# Patient Record
Sex: FEMALE | Race: WHITE | NOT HISPANIC OR LATINO | Employment: FULL TIME | URBAN - METROPOLITAN AREA
[De-identification: names, ages, dates, MRNs, and addresses within clinical notes are randomized per-mention and may not be internally consistent; named-entity substitution may affect disease eponyms.]

---

## 2017-02-23 ENCOUNTER — OFFICE VISIT (OUTPATIENT)
Dept: FAMILY MEDICINE CLINIC | Facility: CLINIC | Age: 32
End: 2017-02-23

## 2017-02-23 VITALS
DIASTOLIC BLOOD PRESSURE: 119 MMHG | BODY MASS INDEX: 34.15 KG/M2 | WEIGHT: 200 LBS | TEMPERATURE: 98.2 F | SYSTOLIC BLOOD PRESSURE: 157 MMHG | HEART RATE: 80 BPM | RESPIRATION RATE: 16 BRPM | HEIGHT: 64 IN

## 2017-02-23 DIAGNOSIS — I10 ESSENTIAL HYPERTENSION: ICD-10-CM

## 2017-02-23 PROCEDURE — 99213 OFFICE O/P EST LOW 20 MIN: CPT | Performed by: FAMILY MEDICINE

## 2017-02-23 RX ORDER — RANITIDINE 150 MG/1
150 TABLET ORAL 2 TIMES DAILY
COMMUNITY
End: 2019-08-12

## 2017-02-23 RX ORDER — LABETALOL 200 MG/1
200 TABLET, FILM COATED ORAL 2 TIMES DAILY
Qty: 180 TABLET | Refills: 1 | Status: SHIPPED | OUTPATIENT
Start: 2017-02-23 | End: 2018-02-01 | Stop reason: SDUPTHER

## 2017-02-23 RX ORDER — NIFEDIPINE 60 MG/1
60 TABLET, EXTENDED RELEASE ORAL DAILY
Qty: 90 TABLET | Refills: 1 | Status: SHIPPED | OUTPATIENT
Start: 2017-02-23 | End: 2018-01-15 | Stop reason: ALTCHOICE

## 2017-02-23 NOTE — PROGRESS NOTES
"Chief Complaint   Patient presents with   • Headache   • Hypertension       Subjective   This patient presents the office to recheck arterial hypertension of 8 months duration.  She remains on labetalol twice daily and 30 mg of nifedipine.  She may be thinking about getting pregnant in the near future.  We will increased nifedipine dose was short-term follow-up.    PHQ-2 Depression Screening Questionaire    During the last month, have you often been bothered by feeling down, depressed, or hopeless?no      During the last month, have you often been bothered by having little interest or pleasure in doing things?no    Review of Systems   Constitutional: Negative for fatigue.   Cardiovascular: Negative for chest pain.       Objective   Visit Vitals   • BP (!) 157/119   • Pulse 80   • Temp 98.2 °F (36.8 °C) (Oral)   • Resp 16   • Ht 64\" (162.6 cm)   • Wt 200 lb (90.7 kg)   • BMI 34.33 kg/m2     Body mass index is 34.33 kg/(m^2).  Physical Exam   Constitutional: She is cooperative. No distress.   Eyes: Conjunctivae and lids are normal.   Neck: Carotid bruit is not present. No tracheal deviation present.   Cardiovascular: Normal rate, regular rhythm and normal heart sounds.    No murmur heard.  Pulmonary/Chest: Effort normal and breath sounds normal.   Neurological: She is alert. She is not disoriented.   Skin: Skin is warm and dry.   Psychiatric: She has a normal mood and affect. Her speech is normal and behavior is normal.   Vitals reviewed.      Assessment/Plan     Problem List Items Addressed This Visit        Cardiovascular and Mediastinum    Essential hypertension    Relevant Medications    labetalol (NORMODYNE) 200 MG tablet    NIFEdipine XL (PROCARDIA XL) 60 MG 24 hr tablet          Outpatient Encounter Prescriptions as of 2/23/2017   Medication Sig Dispense Refill   • labetalol (NORMODYNE) 200 MG tablet Take 1 tablet by mouth 2 (Two) Times a Day for 180 days. 180 tablet 1   • Multiple Vitamins-Minerals (MULTI FOR " HER PO) Take  by mouth.     • raNITIdine (ZANTAC) 150 MG tablet Take 150 mg by mouth 2 (Two) Times a Day.     • [DISCONTINUED] labetalol (NORMODYNE) 200 MG tablet Take 1 tablet by mouth 2 (two) times a day for 180 days. 180 tablet 1   • [DISCONTINUED] norethindrone (MICRONOR) 0.35 MG tablet TAKE 1 TABLET BY MOUTH DAILY  12   • NIFEdipine XL (PROCARDIA XL) 60 MG 24 hr tablet Take 1 tablet by mouth Daily for 180 days. 90 tablet 1   • [DISCONTINUED] citalopram (CeleXA) 20 MG tablet TAKE 1 TABLET BY MOUTH DAILY  5   • [DISCONTINUED] NIFEdipine XL (PROCARDIA XL) 30 MG 24 hr tablet Take 1 tablet by mouth daily for 180 days. 90 tablet 1     No facility-administered encounter medications on file as of 2/23/2017.        No orders of the defined types were placed in this encounter.      Continue with current treatment plan.         F/U in 6 weeks

## 2017-03-08 DIAGNOSIS — I10 ESSENTIAL HYPERTENSION: ICD-10-CM

## 2017-03-08 RX ORDER — NIFEDIPINE 30 MG/1
TABLET, EXTENDED RELEASE ORAL
Qty: 90 TABLET | Refills: 1 | OUTPATIENT
Start: 2017-03-08

## 2017-08-10 DIAGNOSIS — I10 ESSENTIAL HYPERTENSION: ICD-10-CM

## 2017-08-10 RX ORDER — LABETALOL 200 MG/1
TABLET, FILM COATED ORAL
Qty: 180 TABLET | Refills: 1 | OUTPATIENT
Start: 2017-08-10

## 2017-08-10 RX ORDER — NIFEDIPINE 60 MG/1
TABLET, EXTENDED RELEASE ORAL
Qty: 90 TABLET | Refills: 1 | OUTPATIENT
Start: 2017-08-10

## 2018-01-15 ENCOUNTER — OFFICE VISIT (OUTPATIENT)
Dept: FAMILY MEDICINE CLINIC | Facility: CLINIC | Age: 33
End: 2018-01-15

## 2018-01-15 VITALS
HEIGHT: 64 IN | HEART RATE: 71 BPM | SYSTOLIC BLOOD PRESSURE: 151 MMHG | TEMPERATURE: 97.6 F | RESPIRATION RATE: 16 BRPM | WEIGHT: 203 LBS | DIASTOLIC BLOOD PRESSURE: 103 MMHG | BODY MASS INDEX: 34.66 KG/M2

## 2018-01-15 DIAGNOSIS — I10 ESSENTIAL HYPERTENSION: Primary | ICD-10-CM

## 2018-01-15 PROCEDURE — 99213 OFFICE O/P EST LOW 20 MIN: CPT | Performed by: FAMILY MEDICINE

## 2018-01-15 RX ORDER — AMLODIPINE, VALSARTAN AND HYDROCHLOROTHIAZIDE 5; 160; 12.5 MG/1; MG/1; MG/1
1 TABLET ORAL DAILY
Qty: 30 TABLET | Refills: 0 | Status: SHIPPED | OUTPATIENT
Start: 2018-01-15 | End: 2018-02-01 | Stop reason: SDUPTHER

## 2018-01-15 NOTE — PROGRESS NOTES
"Chief Complaint   Patient presents with   • Hypertension       Subjective   This patient presents the office to recheck arterial hypertension.  It is not currently controlled.  She had delivery in November 2017 and is finished with breast-feeding.  We will change from nifedipine to Exforge HCT and for now, continue Normodyne twice daily.  Short-term follow-up in 3 weeks.  I have reviewed and updated her medications, medical history and problem list during today's office visit.        Social History   Substance Use Topics   • Smoking status: Former Smoker     Packs/day: 0.25     Types: Cigarettes   • Smokeless tobacco: Not on file      Comment: quit Sept 2016   • Alcohol use Yes      Comment: rare       Review of Systems   Constitutional: Negative for fatigue.   Cardiovascular: Negative for chest pain.   Neurological: Negative for headaches.       Objective   BP (!) 151/103  Pulse 71  Temp 97.6 °F (36.4 °C) (Oral)   Resp 16  Ht 162.6 cm (64\")  Wt 92.1 kg (203 lb)  BMI 34.84 kg/m2  Body mass index is 34.84 kg/(m^2).  Physical Exam   Constitutional: She is cooperative. No distress.   Eyes: Conjunctivae and lids are normal.   Neck: Carotid bruit is not present. No tracheal deviation present.   Cardiovascular: Normal rate, regular rhythm and normal heart sounds.    No murmur heard.  Pulmonary/Chest: Effort normal and breath sounds normal.   Neurological: She is alert. She is not disoriented.   Skin: Skin is warm and dry.   Psychiatric: She has a normal mood and affect. Her speech is normal and behavior is normal.   Vitals reviewed.      Data Reviewed:        Assessment/Plan     Problem List Items Addressed This Visit        Cardiovascular and Mediastinum    Essential hypertension - Primary    Relevant Medications    Amlodipine-Valsartan-HCTZ 5-160-12.5 MG tablet          Outpatient Encounter Prescriptions as of 1/15/2018   Medication Sig Dispense Refill   • Amlodipine-Valsartan-HCTZ 5-160-12.5 MG tablet Take 1 " tablet by mouth Daily for 30 days. 30 tablet 0   • labetalol (NORMODYNE) 200 MG tablet Take 1 tablet by mouth 2 (Two) Times a Day for 180 days. 180 tablet 1   • Multiple Vitamins-Minerals (MULTI FOR HER PO) Take  by mouth.     • raNITIdine (ZANTAC) 150 MG tablet Take 150 mg by mouth 2 (Two) Times a Day.     • [DISCONTINUED] NIFEdipine XL (PROCARDIA XL) 60 MG 24 hr tablet Take 1 tablet by mouth Daily for 180 days. 90 tablet 1     No facility-administered encounter medications on file as of 1/15/2018.        No orders of the defined types were placed in this encounter.             F/U in three weeks

## 2018-01-15 NOTE — PATIENT INSTRUCTIONS
Amlodipine; Hydrochlorothiazide, HCTZ; Valsartan tablets  What is this medicine?  AMLODIPINE; HYDROCHLOROTHIAZIDE, HCTZ; VALSARTAN (am HIRO lopez peen; tam droe klor oh THYE a zide; emily GREY tan) is a combination of a calcium channel blocker, a diuretic, and an angiotensin II antagonist. This medicine is used to treat high blood pressure.  This medicine may be used for other purposes; ask your health care provider or pharmacist if you have questions.  COMMON BRAND NAME(S): Exforge HCT  What should I tell my health care provider before I take this medicine?  They need to know if you have any of these conditions:  -decreased urine  -heart failure, recent heart attack, or other heart problems  -if you are on a special diet, like a low salt diet  -immune system problems, like lupus  -kidney disease  -liver disease  -an unusual or allergic reaction to amlodipine, hydrochlorothiazide, sulfa drugs, valsartan, other medicines, foods, dyes, or preservatives  -pregnant or trying to get pregnant  -breast-feeding  How should I use this medicine?  Take this medicine by mouth with a glass of water. Follow the directions on the prescription label. You can take it with or without food. If it upsets your stomach, take it with food. Take your medicine at regular intervals. Do not take it more often than directed. Do not stop taking except on your doctor's advice.  Talk to your pediatrician regarding the use of this medicine in children. Special care may be needed.  Patients over 65 years old may have a stronger reaction and need a smaller dose.  Overdosage: If you think you have taken too much of this medicine contact a poison control center or emergency room at once.  NOTE: This medicine is only for you. Do not share this medicine with others.  What if I miss a dose?  If you miss a dose, take it as soon as you can. If it is almost time for your next dose, take only that dose. Do not take double or extra doses.  What may interact with  this medicine?  -alcohol  -barbiturates like phenobarbital  -carbamazepine  -diuretics like triamterene, spironolactone, or amiloride  -lithium  -medicines for blood pressure  -medicines for diabetes  -NSAIDS, medicines for pain and inflammation, like ibuprofen or naproxen  -potassium salts or potassium supplements  -prescription pain medicines  -skeletal muscle relaxants like tubocurarine  -some cholesterol lowering medicines like cholestyramine or colestipol  -steroid medicines like prednisone or cortisone  This list may not describe all possible interactions. Give your health care provider a list of all the medicines, herbs, non-prescription drugs, or dietary supplements you use. Also tell them if you smoke, drink alcohol, or use illegal drugs. Some items may interact with your medicine.  What should I watch for while using this medicine?  Visit your doctor or health care professional for regular checks on your progress. Check your blood pressure as directed. Ask your doctor or health care professional what your blood pressure should be and when you should contact him or her.  You must not get dehydrated. Ask your doctor or health care professional how much fluid you need to drink a day. Check with him or her if you get an attack of severe diarrhea, nausea and vomiting, or if you sweat a lot. The loss of too much body fluid can make it dangerous for you to take this medicine.  Women should inform their doctor if they wish to become pregnant or think they might be pregnant. There is a potential for serious side effects to an unborn child. Talk to your health care professional or pharmacist for more information.  You may get drowsy or dizzy. Do not drive, use machinery, or do anything that needs mental alertness until you know how this medicine affects you. Do not stand or sit up quickly, especially if you are an older patient. This reduces the risk of dizzy or fainting spells. Alcohol may interfere with the effect  of this medicine. Avoid alcoholic drinks.  This medicine may affect your blood sugar level. If you have diabetes, check with your doctor or health care professional before changing the dose of your diabetic medicine.  Avoid salt substitutes unless you are told otherwise by your doctor or health care professional.  This medicine can make you more sensitive to the sun. Keep out of the sun. If you cannot avoid being in the sun, wear protective clothing and use sunscreen. Do not use sun lamps or tanning beds/booths.  Do not treat yourself for coughs, colds, or pain while you are taking this medicine without asking your doctor or health care professional for advice. Some ingredients may increase your blood pressure.  If you are going to have surgery or dialysis, tell your doctor or health care professional that you are taking this medicine.  What side effects may I notice from receiving this medicine?  Side effects that you should report to your doctor or health care professional as soon as possible:  -allergic reactions like skin rash, itching or hives, swelling of the face, lips, or tongue  -breathing problems  -changes in vision  -chest pain  -confusion  -dark urine  -eye pain  -fast, irregular heartbeat  -feeling faint or lightheaded, falls  -low blood pressure  -muscle cramps  -redness, blistering, peeling or loosening of the skin, including inside the mouth  -stomach pain  -swelling of the hands, ankles, or feet  -trouble passing urine or change in the amount of urine  -worsened gout pain  -yellowing of the eyes or skin  Side effects that usually do not require medical attention (report to your doctor or health care professional if they continue or are bothersome):  -change in sex drive or performance  -cough  -diarrhea  -flushing of face, skin  -headache  -nausea, vomiting  -stomach gas, pain  -weak or tired  This list may not describe all possible side effects. Call your doctor for medical advice about side  effects. You may report side effects to FDA at 5-241-WJD-1467.  Where should I keep my medicine?  Keep out of the reach of children.  Store at room temperature between 15 and 30 degrees C (59 and 86 degrees F). Protect from moisture. Throw away any unused medicine after the expiration date.  NOTE: This sheet is a summary. It may not cover all possible information. If you have questions about this medicine, talk to your doctor, pharmacist, or health care provider.  © 2018 [employer] (2017-01-18 18:45:28)

## 2018-02-01 ENCOUNTER — OFFICE VISIT (OUTPATIENT)
Dept: FAMILY MEDICINE CLINIC | Facility: CLINIC | Age: 33
End: 2018-02-01

## 2018-02-01 VITALS
BODY MASS INDEX: 34.66 KG/M2 | TEMPERATURE: 97.1 F | DIASTOLIC BLOOD PRESSURE: 90 MMHG | RESPIRATION RATE: 16 BRPM | WEIGHT: 203 LBS | HEART RATE: 71 BPM | HEIGHT: 64 IN | SYSTOLIC BLOOD PRESSURE: 118 MMHG

## 2018-02-01 DIAGNOSIS — I10 ESSENTIAL HYPERTENSION: ICD-10-CM

## 2018-02-01 DIAGNOSIS — M54.50 ACUTE MIDLINE LOW BACK PAIN WITHOUT SCIATICA: Primary | ICD-10-CM

## 2018-02-01 PROCEDURE — 99213 OFFICE O/P EST LOW 20 MIN: CPT | Performed by: FAMILY MEDICINE

## 2018-02-01 RX ORDER — AMLODIPINE, VALSARTAN AND HYDROCHLOROTHIAZIDE 5; 160; 12.5 MG/1; MG/1; MG/1
1 TABLET ORAL DAILY
Qty: 90 TABLET | Refills: 1 | Status: SHIPPED | OUTPATIENT
Start: 2018-02-01 | End: 2019-08-12

## 2018-02-01 RX ORDER — LABETALOL 200 MG/1
200 TABLET, FILM COATED ORAL EVERY 12 HOURS SCHEDULED
Qty: 180 TABLET | Refills: 1 | Status: SHIPPED | OUTPATIENT
Start: 2018-02-01 | End: 2019-08-12

## 2018-02-01 RX ORDER — CYCLOBENZAPRINE HCL 10 MG
10 TABLET ORAL NIGHTLY PRN
Qty: 15 TABLET | Refills: 0 | Status: SHIPPED | OUTPATIENT
Start: 2018-02-01 | End: 2020-08-21

## 2018-02-01 NOTE — PROGRESS NOTES
"Chief Complaint   Patient presents with   • Hypertension       Subjective    This patient presents the office to recheck arterial hypertension.  It is much improved.  Initially she had side effects of nausea but currently none with her current medicine.  She has had some back pain recently.  In the past she was treated with Flexeril successfully.  We talked about the as needed use of this only and short-term use.  She is aware that medicine could cause drowsiness and problems with reaction time.  She also understands that she should not mix it with alcoholic beverages.  I have reviewed and updated her medications, medical history and problem list during today's office visit.        Social History   Substance Use Topics   • Smoking status: Former Smoker     Packs/day: 0.25     Types: Cigarettes   • Smokeless tobacco: Never Used      Comment: quit Sept 2016   • Alcohol use Yes      Comment: rare       Review of Systems   Musculoskeletal: Positive for back pain.       Objective   /90 (BP Location: Right arm, Patient Position: Sitting, Cuff Size: Large Adult)  Pulse 71  Temp 97.1 °F (36.2 °C) (Oral)   Resp 16  Ht 162.6 cm (64\")  Wt 92.1 kg (203 lb)  BMI 34.84 kg/m2  Body mass index is 34.84 kg/(m^2).  Physical Exam   Constitutional: She is cooperative. No distress.   Eyes: Conjunctivae and lids are normal.   Neck: Carotid bruit is not present. No tracheal deviation present.   Cardiovascular: Normal rate, regular rhythm and normal heart sounds.    No murmur heard.  Pulmonary/Chest: Effort normal and breath sounds normal.   Neurological: She is alert. She is not disoriented.   Skin: Skin is warm and dry.   Psychiatric: She has a normal mood and affect. Her speech is normal and behavior is normal.   Vitals reviewed.      Data Reviewed:        Assessment/Plan     Problem List Items Addressed This Visit        Cardiovascular and Mediastinum    Essential hypertension    Relevant Medications    " Amlodipine-Valsartan-HCTZ 5-160-12.5 MG tablet    labetalol (NORMODYNE) 200 MG tablet      Other Visit Diagnoses     Acute midline low back pain without sciatica    -  Primary          Outpatient Encounter Prescriptions as of 2/1/2018   Medication Sig Dispense Refill   • Amlodipine-Valsartan-HCTZ 5-160-12.5 MG tablet Take 1 tablet by mouth Daily for 180 days. 90 tablet 1   • Multiple Vitamins-Minerals (MULTI FOR HER PO) Take  by mouth.     • raNITIdine (ZANTAC) 150 MG tablet Take 150 mg by mouth 2 (Two) Times a Day.     • [DISCONTINUED] Amlodipine-Valsartan-HCTZ 5-160-12.5 MG tablet Take 1 tablet by mouth Daily for 30 days. 30 tablet 0   • cyclobenzaprine (FLEXERIL) 10 MG tablet Take 1 tablet by mouth At Night As Needed for Muscle Spasms for up to 15 days. 15 tablet 0   • labetalol (NORMODYNE) 200 MG tablet Take 1 tablet by mouth Every 12 (Twelve) Hours for 180 days. 180 tablet 1   • [DISCONTINUED] labetalol (NORMODYNE) 200 MG tablet Take 1 tablet by mouth 2 (Two) Times a Day for 180 days. 180 tablet 1     No facility-administered encounter medications on file as of 2/1/2018.        No orders of the defined types were placed in this encounter.             F/U in 6 months

## 2018-02-07 DIAGNOSIS — I10 ESSENTIAL HYPERTENSION: ICD-10-CM

## 2018-02-08 RX ORDER — AMLODIPINE, VALSARTAN AND HYDROCHLOROTHIAZIDE 5; 160; 12.5 MG/1; MG/1; MG/1
TABLET ORAL
Qty: 30 TABLET | Refills: 0 | OUTPATIENT
Start: 2018-02-08

## 2019-08-12 ENCOUNTER — OFFICE VISIT (OUTPATIENT)
Dept: FAMILY MEDICINE CLINIC | Facility: CLINIC | Age: 34
End: 2019-08-12

## 2019-08-12 VITALS
TEMPERATURE: 97.5 F | SYSTOLIC BLOOD PRESSURE: 122 MMHG | BODY MASS INDEX: 30.49 KG/M2 | HEIGHT: 65 IN | DIASTOLIC BLOOD PRESSURE: 80 MMHG | OXYGEN SATURATION: 98 % | WEIGHT: 183 LBS | HEART RATE: 83 BPM

## 2019-08-12 DIAGNOSIS — K21.9 GASTROESOPHAGEAL REFLUX DISEASE WITHOUT ESOPHAGITIS: ICD-10-CM

## 2019-08-12 DIAGNOSIS — I10 ESSENTIAL HYPERTENSION: Primary | ICD-10-CM

## 2019-08-12 DIAGNOSIS — J30.89 ENVIRONMENTAL AND SEASONAL ALLERGIES: ICD-10-CM

## 2019-08-12 DIAGNOSIS — F41.9 ANXIETY: ICD-10-CM

## 2019-08-12 PROCEDURE — 99214 OFFICE O/P EST MOD 30 MIN: CPT | Performed by: FAMILY MEDICINE

## 2019-08-12 RX ORDER — LABETALOL 100 MG/1
100 TABLET, FILM COATED ORAL 2 TIMES DAILY
Qty: 180 TABLET | Refills: 3 | Status: SHIPPED | OUTPATIENT
Start: 2019-08-12 | End: 2020-08-12

## 2019-08-12 RX ORDER — AMLODIPINE, VALSARTAN AND HYDROCHLOROTHIAZIDE 5; 160; 12.5 MG/1; MG/1; MG/1
1 TABLET ORAL DAILY
Qty: 90 TABLET | Refills: 3 | Status: SHIPPED | OUTPATIENT
Start: 2019-08-12 | End: 2020-07-27

## 2019-08-12 RX ORDER — ALBUTEROL SULFATE 90 UG/1
2 AEROSOL, METERED RESPIRATORY (INHALATION)
COMMUNITY

## 2019-08-12 RX ORDER — LABETALOL 100 MG/1
100 TABLET, FILM COATED ORAL 2 TIMES DAILY
COMMUNITY
End: 2019-08-12

## 2019-08-12 RX ORDER — OMEPRAZOLE 40 MG/1
40 CAPSULE, DELAYED RELEASE ORAL DAILY
Qty: 90 CAPSULE | Refills: 3 | Status: SHIPPED | OUTPATIENT
Start: 2019-08-12

## 2019-08-12 RX ORDER — OMEPRAZOLE 20 MG/1
20 CAPSULE, DELAYED RELEASE ORAL DAILY
COMMUNITY
End: 2019-08-12 | Stop reason: SDUPTHER

## 2019-08-12 NOTE — PROGRESS NOTES
"Subjective   Hope Mya is a 33 y.o. female.     Chief Complaint   Patient presents with   • Hypertension     follow up        History of Present Illness   htn- doing well on meds, has worked on weight loss.     Anxiety- doing well, not on meds    Allergies- stable, uses otc meds prn    gerd- uncontrolled on meds and having worsening symptoms.     The following portions of the patient's history were reviewed and updated as appropriate: allergies, current medications, past family history, past medical history, past social history, past surgical history and problem list.    Past Medical History:   Diagnosis Date   • Headache    • Hypertension    • Kidney stone        Past Surgical History:   Procedure Laterality Date   •  SECTION  2016   •  SECTION  2017       Family History   Problem Relation Age of Onset   • Hypertension Mother    • Hypertension Father    • Heart disease Father    • Diabetes Father    • Alcohol abuse Maternal Grandmother    • Cancer Maternal Grandfather    • Atrial fibrillation Paternal Grandmother    • Heart disease Paternal Grandfather        Social History     Socioeconomic History   • Marital status:      Spouse name: Not on file   • Number of children: Not on file   • Years of education: Not on file   • Highest education level: Not on file   Tobacco Use   • Smoking status: Former Smoker     Packs/day: 0.25     Types: Cigarettes   • Smokeless tobacco: Never Used   • Tobacco comment: quit 2016   Substance and Sexual Activity   • Alcohol use: Yes     Comment: rare       Review of Systems   Respiratory: Negative for shortness of breath.    Cardiovascular: Negative for chest pain.       Objective   Visit Vitals  /80   Pulse 83   Temp 97.5 °F (36.4 °C) (Temporal)   Ht 165.1 cm (65\")   Wt 83 kg (183 lb)   SpO2 98%   BMI 30.45 kg/m²     Body mass index is 30.45 kg/m².  Physical Exam   Constitutional: She is oriented to person, place, and time. She appears " well-developed and well-nourished.   Cardiovascular: Normal rate, regular rhythm, normal heart sounds and intact distal pulses.   Pulmonary/Chest: Effort normal and breath sounds normal.   Musculoskeletal: Normal range of motion. She exhibits no edema.   Neurological: She is alert and oriented to person, place, and time.   Skin: Skin is warm and dry.   Psychiatric: She has a normal mood and affect. Her behavior is normal.         Assessment/Plan   Hope was seen today for hypertension.    Diagnoses and all orders for this visit:    Essential hypertension  -     Lipid Panel  -     Comprehensive Metabolic Panel  -     amLODIPine-Valsartan-HCTZ 5-160-12.5 MG tablet; Take 1 each by mouth Daily.  -     labetalol (NORMODYNE) 100 MG tablet; Take 1 tablet by mouth 2 (Two) Times a Day.    Anxiety    Environmental and seasonal allergies    Gastroesophageal reflux disease without esophagitis  -     omeprazole (priLOSEC) 40 MG capsule; Take 1 capsule by mouth Daily.          Doing well, cont meds, f/u in 6-12 months. Increase PPI and f/u if worse or no better and would consider GI referral for EGD.

## 2019-08-13 LAB
ALBUMIN SERPL-MCNC: 4.6 G/DL (ref 3.5–5.2)
ALBUMIN/GLOB SERPL: 2.3 G/DL
ALP SERPL-CCNC: 61 U/L (ref 39–117)
ALT SERPL-CCNC: 16 U/L (ref 1–33)
AST SERPL-CCNC: 16 U/L (ref 1–32)
BILIRUB SERPL-MCNC: 0.3 MG/DL (ref 0.2–1.2)
BUN SERPL-MCNC: 14 MG/DL (ref 6–20)
BUN/CREAT SERPL: 14.1 (ref 7–25)
CALCIUM SERPL-MCNC: 9.1 MG/DL (ref 8.6–10.5)
CHLORIDE SERPL-SCNC: 101 MMOL/L (ref 98–107)
CHOLEST SERPL-MCNC: 172 MG/DL (ref 0–200)
CO2 SERPL-SCNC: 28.2 MMOL/L (ref 22–29)
CREAT SERPL-MCNC: 0.99 MG/DL (ref 0.57–1)
GLOBULIN SER CALC-MCNC: 2 GM/DL
GLUCOSE SERPL-MCNC: 91 MG/DL (ref 65–99)
HDLC SERPL-MCNC: 51 MG/DL (ref 40–60)
LDLC SERPL CALC-MCNC: 102 MG/DL (ref 0–100)
POTASSIUM SERPL-SCNC: 4.1 MMOL/L (ref 3.5–5.2)
PROT SERPL-MCNC: 6.6 G/DL (ref 6–8.5)
SODIUM SERPL-SCNC: 141 MMOL/L (ref 136–145)
TRIGL SERPL-MCNC: 97 MG/DL (ref 0–150)
VLDLC SERPL CALC-MCNC: 19.4 MG/DL

## 2020-03-12 ENCOUNTER — OFFICE VISIT (OUTPATIENT)
Dept: FAMILY MEDICINE CLINIC | Facility: CLINIC | Age: 35
End: 2020-03-12

## 2020-03-12 VITALS
WEIGHT: 178 LBS | HEIGHT: 65 IN | SYSTOLIC BLOOD PRESSURE: 120 MMHG | OXYGEN SATURATION: 98 % | BODY MASS INDEX: 29.66 KG/M2 | HEART RATE: 88 BPM | TEMPERATURE: 97.6 F | DIASTOLIC BLOOD PRESSURE: 70 MMHG

## 2020-03-12 DIAGNOSIS — M54.2 NECK PAIN ON LEFT SIDE: Primary | ICD-10-CM

## 2020-03-12 PROCEDURE — 99213 OFFICE O/P EST LOW 20 MIN: CPT | Performed by: FAMILY MEDICINE

## 2020-03-12 NOTE — PROGRESS NOTES
Chief Complaint   Patient presents with   • Neck Pain     C/o neck swelling, no major pain and doesn't feel bad.        Subjective   Hope Mya is a 34 y.o. female.     History of Present Illness   Left side of neck is swollen for past 2 days.  She states she feels fine but her boss made her go to [facility] to get checked out.  No fever or chills.  No cough or congestion and she states she does not feel bad.   The following portions of the patient's history were reviewed and updated as appropriate: allergies, current medications, past family history, past medical history, past social history, past surgical history and problem list.    Review of Systems   Constitutional: Negative for fatigue.   Eyes: Negative for blurred vision.   Respiratory: Negative for cough, chest tightness and shortness of breath.    Cardiovascular: Negative for chest pain, palpitations and leg swelling.   Neurological: Negative for dizziness, light-headedness and headache.       Patient Active Problem List   Diagnosis   • Essential hypertension   • Grief reaction   • Anxiety   • Environmental and seasonal allergies   • Gastroesophageal reflux disease without esophagitis   • Neck pain on left side       Allergies   Allergen Reactions   • Cat Hair Extract          Current Outpatient Medications:   •  albuterol sulfate  (90 Base) MCG/ACT inhaler, Inhale 2 puffs., Disp: , Rfl:   •  amLODIPine-Valsartan-HCTZ 5-160-12.5 MG tablet, Take 1 each by mouth Daily., Disp: 90 tablet, Rfl: 3  •  labetalol (NORMODYNE) 100 MG tablet, Take 1 tablet by mouth 2 (Two) Times a Day., Disp: 180 tablet, Rfl: 3  •  Multiple Vitamins-Minerals (MULTI FOR HER PO), Take  by mouth., Disp: , Rfl:   •  omeprazole (priLOSEC) 40 MG capsule, Take 1 capsule by mouth Daily., Disp: 90 capsule, Rfl: 3  •  cyclobenzaprine (FLEXERIL) 10 MG tablet, Take 1 tablet by mouth At Night As Needed for Muscle Spasms for up to 15 days., Disp: 15 tablet, Rfl: 0    Past Medical History:  "  Diagnosis Date   • Allergic rhinitis    • Anxiety    • Asthma    • Headache    • History of gastroesophageal reflux (GERD)    • Hypertension    • Kidney stone    • Low back ache    • Migraine        Past Surgical History:   Procedure Laterality Date   •  SECTION  2016   •  SECTION  2017       Family History   Problem Relation Age of Onset   • Hypertension Mother    • Hyperlipidemia Mother    • Alcohol abuse Mother    • Hypertension Father    • Heart disease Father    • Diabetes Father    • Stroke Father    • COPD Father    • Coronary artery disease Father    • Alcohol abuse Maternal Grandmother    • Depression Maternal Grandmother    • Cancer Maternal Grandfather    • Atrial fibrillation Paternal Grandmother    • Arthritis Paternal Grandmother    • Heart disease Paternal Grandfather        Social History     Tobacco Use   • Smoking status: Former Smoker     Packs/day: 0.25     Types: Cigarettes   • Smokeless tobacco: Never Used   • Tobacco comment: quit 2016   Substance Use Topics   • Alcohol use: Yes     Comment: rare            Objective     Visit Vitals  /70   Pulse 88   Temp 97.6 °F (36.4 °C)   Ht 165.1 cm (65\")   Wt 80.7 kg (178 lb)   SpO2 98%   BMI 29.62 kg/m²       Physical Exam   Constitutional: She appears well-developed and well-nourished. No distress.   HENT:   Head: Normocephalic and atraumatic.   Right Ear: External ear normal.   Left Ear: External ear normal.   Nose: Nose normal.   Mouth/Throat: Oropharynx is clear and moist. No oropharyngeal exudate.   Eyes: Conjunctivae and lids are normal. Right eye exhibits no discharge. Left eye exhibits no discharge. No scleral icterus.   Neck: Trachea normal. Carotid bruit is not present. No tracheal deviation present. No thyroid mass and no thyromegaly present.   Cardiovascular: Normal rate, regular rhythm and normal heart sounds. Exam reveals no friction rub.   No murmur heard.  Pulmonary/Chest: Effort normal and breath " sounds normal. No stridor. No respiratory distress. She has no wheezes. She has no rales.   Lymphadenopathy:     She has no cervical adenopathy.   Neurological: She is alert. She is not disoriented.   Skin: Skin is warm and dry.   Psychiatric: She has a normal mood and affect. Her speech is normal and behavior is normal. She is attentive.   Nursing note and vitals reviewed.      Lab Results   Component Value Date    BUN 14 08/12/2019    CREATININE 0.99 08/12/2019    EGFRIFNONA 64 08/12/2019    EGFRIFAFRI 78 08/12/2019    BCR 14.1 08/12/2019    K 4.1 08/12/2019    CO2 28.2 08/12/2019    CALCIUM 9.1 08/12/2019    PROTENTOTREF 6.6 08/12/2019    ALBUMIN 4.60 08/12/2019    LABIL2 2.3 08/12/2019    AST 16 08/12/2019    ALT 16 08/12/2019       No results found for: WBC, RBC, HGB, HCT, MCV, MCH, MCHC, RDW, RDWSD, MPV, PLT, NEUTRORELPCT, LYMPHORELPCT, MONORELPCT, EOSRELPCT, BASORELPCT, AUTOIGPER, NEUTROABS, LYMPHSABS, MONOSABS, EOSABS, BASOSABS, AUTOIGNUM, NRBC    No results found for: HGBA1C    No results found for: [id number]    No results found for: TSH    No results found for: CHOL  Lab Results   Component Value Date    TRIG 97 08/12/2019     Lab Results   Component Value Date    HDL 51 08/12/2019     Lab Results   Component Value Date     (H) 08/12/2019     Lab Results   Component Value Date    VLDL 19.4 08/12/2019     No results found for: LDLHDL      Procedures    Assessment/Plan   Problems Addressed this Visit        Nervous and Auditory    Neck pain on left side - Primary          No orders of the defined types were placed in this encounter.      Current Outpatient Medications   Medication Sig Dispense Refill   • albuterol sulfate  (90 Base) MCG/ACT inhaler Inhale 2 puffs.     • amLODIPine-Valsartan-HCTZ 5-160-12.5 MG tablet Take 1 each by mouth Daily. 90 tablet 3   • labetalol (NORMODYNE) 100 MG tablet Take 1 tablet by mouth 2 (Two) Times a Day. 180 tablet 3   • Multiple Vitamins-Minerals (MULTI FOR  HER PO) Take  by mouth.     • omeprazole (priLOSEC) 40 MG capsule Take 1 capsule by mouth Daily. 90 capsule 3   • cyclobenzaprine (FLEXERIL) 10 MG tablet Take 1 tablet by mouth At Night As Needed for Muscle Spasms for up to 15 days. 15 tablet 0     No current facility-administered medications for this visit.      Reassured pt.  Ok to go back to work.    No follow-ups on file.    There are no Patient Instructions on file for this visit.

## 2020-07-27 DIAGNOSIS — I10 ESSENTIAL HYPERTENSION: ICD-10-CM

## 2020-07-27 RX ORDER — AMLODIPINE, VALSARTAN AND HYDROCHLOROTHIAZIDE 5; 160; 12.5 MG/1; MG/1; MG/1
TABLET ORAL
Qty: 90 TABLET | Refills: 3 | Status: SHIPPED | OUTPATIENT
Start: 2020-07-27 | End: 2020-08-21 | Stop reason: SDUPTHER

## 2020-08-12 DIAGNOSIS — I10 ESSENTIAL HYPERTENSION: ICD-10-CM

## 2020-08-12 RX ORDER — LABETALOL 100 MG/1
100 TABLET, FILM COATED ORAL 2 TIMES DAILY
Qty: 30 TABLET | Refills: 0 | Status: SHIPPED | OUTPATIENT
Start: 2020-08-12 | End: 2020-08-21 | Stop reason: SDUPTHER

## 2020-08-21 ENCOUNTER — OFFICE VISIT (OUTPATIENT)
Dept: FAMILY MEDICINE CLINIC | Facility: CLINIC | Age: 35
End: 2020-08-21

## 2020-08-21 VITALS
HEART RATE: 85 BPM | HEIGHT: 65 IN | SYSTOLIC BLOOD PRESSURE: 122 MMHG | OXYGEN SATURATION: 97 % | BODY MASS INDEX: 31.16 KG/M2 | WEIGHT: 187 LBS | DIASTOLIC BLOOD PRESSURE: 78 MMHG | TEMPERATURE: 98.7 F

## 2020-08-21 DIAGNOSIS — Z11.59 ENCOUNTER FOR HEPATITIS C SCREENING TEST FOR LOW RISK PATIENT: ICD-10-CM

## 2020-08-21 DIAGNOSIS — I10 ESSENTIAL HYPERTENSION: ICD-10-CM

## 2020-08-21 DIAGNOSIS — Z00.00 HEALTHCARE MAINTENANCE: Primary | ICD-10-CM

## 2020-08-21 PROCEDURE — 99395 PREV VISIT EST AGE 18-39: CPT | Performed by: FAMILY MEDICINE

## 2020-08-21 RX ORDER — AMLODIPINE, VALSARTAN AND HYDROCHLOROTHIAZIDE 5; 160; 12.5 MG/1; MG/1; MG/1
1 TABLET ORAL DAILY
Qty: 90 TABLET | Refills: 3 | Status: SHIPPED | OUTPATIENT
Start: 2020-08-21 | End: 2021-07-21 | Stop reason: SDUPTHER

## 2020-08-21 RX ORDER — LABETALOL 100 MG/1
TABLET, FILM COATED ORAL
Qty: 270 TABLET | Refills: 3 | Status: SHIPPED | OUTPATIENT
Start: 2020-08-21 | End: 2021-07-21 | Stop reason: SDUPTHER

## 2020-08-21 NOTE — PROGRESS NOTES
"Diamante Roque is here today for an annual physical exam.     Eating a healthy diet. Exercising routinely.  Regular periods. Wears seat belt. Feels safe at home.   Sexual activity: yes  Birth control: btl  Pregnancy:, one stillborn  Sexual and gender orientation: heterosexual female    Having some high BP in the afternoon and wanting to go up on meds    PHQ-2 Depression Screening  Little interest or pleasure in doing things? 0   Feeling down, depressed, or hopeless? 0   PHQ-2 Total Score 0         I have reviewed the patient's medical, family, and social history in detail and updated the computerized patient record.    Screening history:  Colonoscopy - not due  Mammogram- not yet due, Pap/pelvic - utd  Metabolic - due    Health Maintenance   Topic Date Due   • PNEUMOCOCCAL VACCINE (19-64 MEDIUM RISK) (1 of 1 - PPSV23) 2003   • HEPATITIS C SCREENING  2016   • ANNUAL PHYSICAL  2020   • INFLUENZA VACCINE  2020   • PAP SMEAR  2022   • TDAP/TD VACCINES (4 - Td) 2027       Review of Systems   Constitutional: Negative for fever.   HENT: Negative for hearing loss.    Eyes: Negative for visual disturbance.   Respiratory: Negative for shortness of breath.    Cardiovascular: Negative for chest pain.   Gastrointestinal: Negative for constipation and diarrhea.   Genitourinary: Negative for difficulty urinating.   Musculoskeletal: Negative for arthralgias and myalgias.   Skin: Negative for rash.   Hematological: Does not bruise/bleed easily.   Psychiatric/Behavioral: Negative for dysphoric mood.       /78 (BP Location: Right arm, Patient Position: Sitting)   Pulse 85   Temp 98.7 °F (37.1 °C)   Ht 165.1 cm (65\")   Wt 84.8 kg (187 lb)   SpO2 97%   BMI 31.12 kg/m²      Physical Exam    Vital signs reviewed.  General appearance: No acute distress  Eyes: conjunctiva clear without erythema; pupils equally round and reactive  ENT: external ears and nose normal; hearing normal, oropharynx " clear  Neck: supple; no thyromegaly  CV: normal rate and rhythm; no peripheral edema  Respiratory: normal respiratory effort; lungs clear to auscultation bilaterally  MSK: normal gait and station; no focal joint deformity or swelling  Skin: no rash or wounds; normal turgor  Neuro: cranial nerves 2-12 grossly intact; normal sensation to light touch  Psych: mood and affect normal; recent and remote memory intact    No visits with results within 2 Week(s) from this visit.   Latest known visit with results is:   Office Visit on 08/12/2019   Component Date Value Ref Range Status   • Total Cholesterol 08/12/2019 172  0 - 200 mg/dL Final   • Triglycerides 08/12/2019 97  0 - 150 mg/dL Final   • HDL Cholesterol 08/12/2019 51  40 - 60 mg/dL Final   • VLDL Cholesterol 08/12/2019 19.4  mg/dL Final   • LDL Cholesterol  08/12/2019 102* 0 - 100 mg/dL Final   • Glucose 08/12/2019 91  65 - 99 mg/dL Final   • BUN 08/12/2019 14  6 - 20 mg/dL Final   • Creatinine 08/12/2019 0.99  0.57 - 1.00 mg/dL Final   • eGFR Non  Am 08/12/2019 64  >60 mL/min/1.73 Final   • eGFR African Am 08/12/2019 78  >60 mL/min/1.73 Final   • BUN/Creatinine Ratio 08/12/2019 14.1  7.0 - 25.0 Final   • Sodium 08/12/2019 141  136 - 145 mmol/L Final   • Potassium 08/12/2019 4.1  3.5 - 5.2 mmol/L Final   • Chloride 08/12/2019 101  98 - 107 mmol/L Final   • Total CO2 08/12/2019 28.2  22.0 - 29.0 mmol/L Final   • Calcium 08/12/2019 9.1  8.6 - 10.5 mg/dL Final   • Total Protein 08/12/2019 6.6  6.0 - 8.5 g/dL Final   • Albumin 08/12/2019 4.60  3.50 - 5.20 g/dL Final   • Globulin 08/12/2019 2.0  gm/dL Final   • A/G Ratio 08/12/2019 2.3  g/dL Final   • Total Bilirubin 08/12/2019 0.3  0.2 - 1.2 mg/dL Final   • Alkaline Phosphatase 08/12/2019 61  39 - 117 U/L Final   • AST (SGOT) 08/12/2019 16  1 - 32 U/L Final   • ALT (SGPT) 08/12/2019 16  1 - 33 U/L Final         Current Outpatient Medications:   •  albuterol sulfate  (90 Base) MCG/ACT inhaler, Inhale 2  puffs., Disp: , Rfl:   •  amLODIPine-Valsartan-HCTZ 5-160-12.5 MG tablet, Take 1 tablet by mouth Daily., Disp: 90 tablet, Rfl: 3  •  cyclobenzaprine (FLEXERIL) 10 MG tablet, Take 1 tablet by mouth At Night As Needed for Muscle Spasms for up to 15 days., Disp: 15 tablet, Rfl: 0  •  labetalol (NORMODYNE) 100 MG tablet, One tab po in am and 2 tabs po in afternoon., Disp: 270 tablet, Rfl: 3  •  omeprazole (priLOSEC) 40 MG capsule, Take 1 capsule by mouth Daily., Disp: 90 capsule, Rfl: 3  •  Multiple Vitamins-Minerals (MULTI FOR HER PO), Take  by mouth., Disp: , Rfl:     Diamante was seen today for annual exam.    Diagnoses and all orders for this visit:    Healthcare maintenance  -     Comprehensive Metabolic Panel  -     Lipid Panel    Essential hypertension  -     Comprehensive Metabolic Panel  -     Lipid Panel  -     labetalol (NORMODYNE) 100 MG tablet; One tab po in am and 2 tabs po in afternoon.  -     amLODIPine-Valsartan-HCTZ 5-160-12.5 MG tablet; Take 1 tablet by mouth Daily.    Encounter for hepatitis C screening test for low risk patient  -     Hepatitis C Antibody        Encourage healthy diet and exercise.  Encourage patient to stay up to date on screening examinations as indicated based on age and risk factors. F/U yearly.     Increased labetalol and f/u if not controlled.

## 2020-08-22 LAB
ALBUMIN SERPL-MCNC: 4.9 G/DL (ref 3.5–5.2)
ALBUMIN/GLOB SERPL: 2.5 G/DL
ALP SERPL-CCNC: 63 U/L (ref 39–117)
ALT SERPL-CCNC: 17 U/L (ref 1–33)
AST SERPL-CCNC: 15 U/L (ref 1–32)
BILIRUB SERPL-MCNC: 0.3 MG/DL (ref 0–1.2)
BUN SERPL-MCNC: 15 MG/DL (ref 6–20)
BUN/CREAT SERPL: 14.4 (ref 7–25)
CALCIUM SERPL-MCNC: 9.7 MG/DL (ref 8.6–10.5)
CHLORIDE SERPL-SCNC: 102 MMOL/L (ref 98–107)
CHOLEST SERPL-MCNC: 172 MG/DL (ref 0–200)
CO2 SERPL-SCNC: 27.9 MMOL/L (ref 22–29)
CREAT SERPL-MCNC: 1.04 MG/DL (ref 0.57–1)
GLOBULIN SER CALC-MCNC: 2 GM/DL
GLUCOSE SERPL-MCNC: 85 MG/DL (ref 65–99)
HCV AB S/CO SERPL IA: <0.1 S/CO RATIO (ref 0–0.9)
HDLC SERPL-MCNC: 56 MG/DL (ref 40–60)
LDLC SERPL CALC-MCNC: 102 MG/DL (ref 0–100)
POTASSIUM SERPL-SCNC: 4.5 MMOL/L (ref 3.5–5.2)
PROT SERPL-MCNC: 6.9 G/DL (ref 6–8.5)
SODIUM SERPL-SCNC: 139 MMOL/L (ref 136–145)
TRIGL SERPL-MCNC: 70 MG/DL (ref 0–150)
VLDLC SERPL CALC-MCNC: 14 MG/DL

## 2021-07-21 DIAGNOSIS — I10 ESSENTIAL HYPERTENSION: ICD-10-CM

## 2021-07-21 RX ORDER — AMLODIPINE, VALSARTAN AND HYDROCHLOROTHIAZIDE 5; 160; 12.5 MG/1; MG/1; MG/1
1 TABLET ORAL DAILY
Qty: 90 TABLET | Refills: 3 | Status: SHIPPED | OUTPATIENT
Start: 2021-07-21 | End: 2021-08-04 | Stop reason: SDUPTHER

## 2021-07-21 RX ORDER — LABETALOL 100 MG/1
TABLET, FILM COATED ORAL
Qty: 270 TABLET | Refills: 3 | Status: SHIPPED | OUTPATIENT
Start: 2021-07-21 | End: 2021-08-11

## 2021-07-21 NOTE — TELEPHONE ENCOUNTER
Caller: Diamante Roque    Relationship: Self    Best call back number: [phone]    Medication needed:   Requested Prescriptions     Pending Prescriptions Disp Refills   • amLODIPine-Valsartan-HCTZ 5-160-12.5 MG tablet 90 tablet 3     Sig: Take 1 tablet by mouth Daily.   • labetalol (NORMODYNE) 100 MG tablet 270 tablet 3     Sig: One tab po in am and 2 tabs po in afternoon.       When do you need the refill by: 07/30    What additional details did the patient provide when requesting the medication: PATIENT HAS A 10 DAY SUPPLY      Does the patient have less than a 3 day supply:  [] Yes  [x] No    What is the patient's preferred pharmacy: [id number] - [place], [place] - [address] . - [phone] [facility] [phone] FX

## 2021-08-04 DIAGNOSIS — I10 ESSENTIAL HYPERTENSION: ICD-10-CM

## 2021-08-04 RX ORDER — AMLODIPINE, VALSARTAN AND HYDROCHLOROTHIAZIDE 5; 160; 12.5 MG/1; MG/1; MG/1
1 TABLET ORAL DAILY
Qty: 7 TABLET | Refills: 0 | Status: SHIPPED | OUTPATIENT
Start: 2021-08-04 | End: 2021-08-11

## 2021-08-11 ENCOUNTER — OFFICE VISIT (OUTPATIENT)
Dept: FAMILY MEDICINE CLINIC | Facility: CLINIC | Age: 36
End: 2021-08-11

## 2021-08-11 VITALS
BODY MASS INDEX: 29.16 KG/M2 | DIASTOLIC BLOOD PRESSURE: 72 MMHG | HEIGHT: 65 IN | WEIGHT: 175 LBS | HEART RATE: 62 BPM | OXYGEN SATURATION: 100 % | TEMPERATURE: 97.3 F | SYSTOLIC BLOOD PRESSURE: 100 MMHG

## 2021-08-11 DIAGNOSIS — I10 ESSENTIAL HYPERTENSION: Primary | ICD-10-CM

## 2021-08-11 DIAGNOSIS — F41.9 ANXIETY: ICD-10-CM

## 2021-08-11 DIAGNOSIS — J30.89 ENVIRONMENTAL AND SEASONAL ALLERGIES: ICD-10-CM

## 2021-08-11 DIAGNOSIS — K21.9 GASTROESOPHAGEAL REFLUX DISEASE WITHOUT ESOPHAGITIS: ICD-10-CM

## 2021-08-11 PROCEDURE — 99214 OFFICE O/P EST MOD 30 MIN: CPT | Performed by: FAMILY MEDICINE

## 2021-08-11 RX ORDER — AMLODIPINE BESYLATE 5 MG/1
5 TABLET ORAL DAILY
Qty: 90 TABLET | Refills: 3 | Status: SHIPPED | OUTPATIENT
Start: 2021-08-11 | End: 2022-07-11 | Stop reason: SDUPTHER

## 2021-08-11 RX ORDER — LISINOPRIL 40 MG/1
40 TABLET ORAL DAILY
Qty: 90 TABLET | Refills: 3 | Status: SHIPPED | OUTPATIENT
Start: 2021-08-11 | End: 2022-07-11 | Stop reason: SDUPTHER

## 2021-08-11 RX ORDER — HYDROCHLOROTHIAZIDE 25 MG/1
25 TABLET ORAL DAILY
Qty: 90 TABLET | Refills: 3 | Status: SHIPPED | OUTPATIENT
Start: 2021-08-11 | End: 2022-07-11 | Stop reason: SDUPTHER

## 2021-08-11 NOTE — PROGRESS NOTES
Celine Roque is a 35 y.o. female.     Chief Complaint   Patient presents with   • Hypertension     hpi   htn- doing well on meds, has worked on weight loss. Needing a new med as the combo htn med is not to be found in pharmacies. Pt does not want to be on labetolol.     Anxiety- doing well, not on meds    Allergies- stable, uses otc meds prn    gerd- Doing well on meds daily.      The following portions of the patient's history were reviewed and updated as appropriate: allergies, current medications, past family history, past medical history, past social history, past surgical history and problem list.    Past Medical History:   Diagnosis Date   • Allergic rhinitis    • Anxiety    • Asthma    • Headache    • History of gastroesophageal reflux (GERD)    • Hypertension    • Kidney stone    • Low back ache    • Migraine        Past Surgical History:   Procedure Laterality Date   •  SECTION  2016   •  SECTION  2017       Family History   Problem Relation Age of Onset   • Hypertension Mother    • Hyperlipidemia Mother    • Alcohol abuse Mother    • Hypertension Father    • Heart disease Father    • Diabetes Father    • Stroke Father    • COPD Father    • Coronary artery disease Father    • Alcohol abuse Maternal Grandmother    • Depression Maternal Grandmother    • Cancer Maternal Grandfather    • Atrial fibrillation Paternal Grandmother    • Arthritis Paternal Grandmother    • Heart disease Paternal Grandfather        Social History     Socioeconomic History   • Marital status:      Spouse name: Not on file   • Number of children: Not on file   • Years of education: Not on file   • Highest education level: Not on file   Tobacco Use   • Smoking status: Current Every Day Smoker     Packs/day: 0.25     Types: Electronic Cigarette   • Smokeless tobacco: Never Used   • Tobacco comment: quit 2016   Substance and Sexual Activity   • Alcohol use: Yes     Comment: rare   • Drug  "use: No       Review of Systems   Respiratory: Negative for shortness of breath.    Cardiovascular: Negative for chest pain.       Objective   Visit Vitals  /72 (BP Location: Left arm, Patient Position: Sitting)   Pulse 62   Temp 97.3 °F (36.3 °C)   Ht 165.1 cm (65\")   Wt 79.4 kg (175 lb)   SpO2 100%   BMI 29.12 kg/m²     Body mass index is 29.12 kg/m².  Physical Exam   Constitutional: She is oriented to person, place, and time. She appears well-developed and well-nourished.   Cardiovascular: Normal rate, regular rhythm and normal heart sounds.   Pulmonary/Chest: Effort normal and breath sounds normal.   Abdominal: Normal appearance.   Musculoskeletal: Normal range of motion.   Neurological: She is alert and oriented to person, place, and time.   Skin: Skin is warm and dry.   Psychiatric: Her behavior is normal. Mood normal.         Assessment/Plan   Diagnoses and all orders for this visit:    1. Essential hypertension (Primary)  -     Comprehensive Metabolic Panel  -     Lipid Panel  -     amLODIPine (NORVASC) 5 MG tablet; Take 1 tablet by mouth Daily.  Dispense: 90 tablet; Refill: 3  -     lisinopril (PRINIVIL,ZESTRIL) 40 MG tablet; Take 1 tablet by mouth Daily.  Dispense: 90 tablet; Refill: 3  -     hydroCHLOROthiazide (HYDRODIURIL) 25 MG tablet; Take 1 tablet by mouth Daily.  Dispense: 90 tablet; Refill: 3    2. Gastroesophageal reflux disease without esophagitis    3. Environmental and seasonal allergies    4. Anxiety          Doing well, cont meds, f/u in 6-12 months. And sooner if bp not well controlled. Discussed changes with pt and she will monitor closely.      "

## 2021-08-12 LAB
ALBUMIN SERPL-MCNC: 4.3 G/DL (ref 3.5–5.2)
ALBUMIN/GLOB SERPL: 2 G/DL
ALP SERPL-CCNC: 51 U/L (ref 39–117)
ALT SERPL-CCNC: 15 U/L (ref 1–33)
AST SERPL-CCNC: 17 U/L (ref 1–32)
BILIRUB SERPL-MCNC: 0.2 MG/DL (ref 0–1.2)
BUN SERPL-MCNC: 13 MG/DL (ref 6–20)
BUN/CREAT SERPL: 14.1 (ref 7–25)
CALCIUM SERPL-MCNC: 9.3 MG/DL (ref 8.6–10.5)
CHLORIDE SERPL-SCNC: 104 MMOL/L (ref 98–107)
CHOLEST SERPL-MCNC: 166 MG/DL (ref 0–200)
CO2 SERPL-SCNC: 25.7 MMOL/L (ref 22–29)
CREAT SERPL-MCNC: 0.92 MG/DL (ref 0.57–1)
GLOBULIN SER CALC-MCNC: 2.1 GM/DL
GLUCOSE SERPL-MCNC: 90 MG/DL (ref 65–99)
HDLC SERPL-MCNC: 59 MG/DL (ref 40–60)
LDLC SERPL CALC-MCNC: 96 MG/DL (ref 0–100)
POTASSIUM SERPL-SCNC: 4.1 MMOL/L (ref 3.5–5.2)
PROT SERPL-MCNC: 6.4 G/DL (ref 6–8.5)
SODIUM SERPL-SCNC: 136 MMOL/L (ref 136–145)
TRIGL SERPL-MCNC: 56 MG/DL (ref 0–150)
VLDLC SERPL CALC-MCNC: 11 MG/DL (ref 5–40)

## 2022-07-11 ENCOUNTER — OFFICE VISIT (OUTPATIENT)
Dept: FAMILY MEDICINE CLINIC | Facility: CLINIC | Age: 37
End: 2022-07-11

## 2022-07-11 VITALS
OXYGEN SATURATION: 99 % | WEIGHT: 171.6 LBS | DIASTOLIC BLOOD PRESSURE: 74 MMHG | HEART RATE: 80 BPM | TEMPERATURE: 98 F | BODY MASS INDEX: 28.56 KG/M2 | SYSTOLIC BLOOD PRESSURE: 110 MMHG

## 2022-07-11 DIAGNOSIS — I10 ESSENTIAL HYPERTENSION: ICD-10-CM

## 2022-07-11 DIAGNOSIS — G47.00 INSOMNIA, UNSPECIFIED TYPE: ICD-10-CM

## 2022-07-11 DIAGNOSIS — Z00.00 HEALTHCARE MAINTENANCE: Primary | ICD-10-CM

## 2022-07-11 PROCEDURE — 99395 PREV VISIT EST AGE 18-39: CPT | Performed by: NURSE PRACTITIONER

## 2022-07-11 RX ORDER — HYDROCHLOROTHIAZIDE 25 MG/1
25 TABLET ORAL DAILY
Qty: 90 TABLET | Refills: 3 | Status: SHIPPED | OUTPATIENT
Start: 2022-07-11

## 2022-07-11 RX ORDER — LISINOPRIL 40 MG/1
40 TABLET ORAL DAILY
Qty: 90 TABLET | Refills: 3 | Status: SHIPPED | OUTPATIENT
Start: 2022-07-11

## 2022-07-11 RX ORDER — AMLODIPINE BESYLATE 5 MG/1
5 TABLET ORAL DAILY
Qty: 90 TABLET | Refills: 3 | Status: SHIPPED | OUTPATIENT
Start: 2022-07-11

## 2022-07-11 RX ORDER — LABETALOL 100 MG/1
100 TABLET, FILM COATED ORAL 2 TIMES DAILY
Qty: 180 TABLET | Refills: 3 | Status: SHIPPED | OUTPATIENT
Start: 2022-07-11 | End: 2022-10-09

## 2022-07-11 RX ORDER — LABETALOL 100 MG/1
100 TABLET, FILM COATED ORAL 2 TIMES DAILY
COMMUNITY
Start: 2022-04-28 | End: 2022-07-11 | Stop reason: SDUPTHER

## 2022-07-11 NOTE — PROGRESS NOTES
Diamante Roque is here today for an annual physical exam.     Eating a healthy diet. Exercising routinely. Not exercising since having in Covid-19 in May 2022.   Regular periods. Wears seat belt. Feels safe at home.   Sexual activity: currently active  Birth control: surgical  Pregnancy: 3    PHQ-2 Depression Screening  Little interest or pleasure in doing things? 0-->not at all   Feeling down, depressed, or hopeless? 0-->not at all   PHQ-2 Total Score 0         I have reviewed the patient's medical, family, and social history in detail and updated the computerized patient record.    Screening history:  Colonoscopy - not due yet  Mammogram- not due yet, Pap/pelvic - 2022  Metabolic - 2021    Health Maintenance   Topic Date Due   • Pneumococcal Vaccine 0-64 (1 - PCV) Never done   • ANNUAL PHYSICAL  08/22/2021   • INFLUENZA VACCINE  10/01/2022   • PAP SMEAR  03/09/2025   • TDAP/TD VACCINES (4 - Td or Tdap) 08/30/2027   • HEPATITIS C SCREENING  Completed   • COVID-19 Vaccine  Completed       Review of Systems   Constitutional: Positive for fatigue. Negative for activity change, appetite change and fever.   HENT: Negative for congestion, postnasal drip, rhinorrhea and sore throat.    Eyes: Negative for pain and redness.   Respiratory: Negative for cough, shortness of breath and wheezing.    Cardiovascular: Negative for chest pain, palpitations and leg swelling.   Gastrointestinal: Negative for abdominal pain, constipation, diarrhea, nausea and vomiting.   Genitourinary: Negative for difficulty urinating, flank pain and hematuria.   Musculoskeletal: Negative for arthralgias, back pain, myalgias and neck pain.   Neurological: Negative for dizziness, weakness, numbness and headaches.   Psychiatric/Behavioral: Positive for sleep disturbance. Negative for decreased concentration. The patient is not nervous/anxious.        /74 (BP Location: Right arm, Patient Position: Sitting, Cuff Size: Large Adult)   Pulse 80   Temp 98  °F (36.7 °C) (Temporal)   Wt 77.8 kg (171 lb 9.6 oz)   SpO2 99%   BMI 28.56 kg/m²      Physical Exam    Vital signs reviewed.  General appearance: No acute distress  Eyes: conjunctiva clear without erythema; pupils equally round and reactive  ENT: external ears and nose normal; hearing normal, oropharynx clear  Neck: supple; no thyromegaly  CV: normal rate and rhythm; no peripheral edema  Respiratory: normal respiratory effort; lungs clear to auscultation bilaterally  Abd: Flat, soft, nontender; bowel sounds auscultated over all 4 quadrants  MSK: normal gait and station; no focal joint deformity or swelling  Skin: no rash or wounds; normal turgor  Neuro: cranial nerves 2-12 grossly intact; normal sensation to light touch  Psych: mood and affect normal; recent and remote memory intact    Office Visit on 07/11/2022   Component Date Value Ref Range Status   • WBC 07/11/2022 9.2  3.4 - 10.8 x10E3/uL Final   • RBC 07/11/2022 4.17  3.77 - 5.28 x10E6/uL Final   • Hemoglobin 07/11/2022 12.4  11.1 - 15.9 g/dL Final   • Hematocrit 07/11/2022 38.5  34.0 - 46.6 % Final   • MCV 07/11/2022 92  79 - 97 fL Final   • MCH 07/11/2022 29.7  26.6 - 33.0 pg Final   • MCHC 07/11/2022 32.2  31.5 - 35.7 g/dL Final   • RDW 07/11/2022 12.4  11.7 - 15.4 % Final   • Platelets 07/11/2022 340  150 - 450 x10E3/uL Final   • Neutrophil Rel % 07/11/2022 68  Not Estab. % Final   • Lymphocyte Rel % 07/11/2022 24  Not Estab. % Final   • Monocyte Rel % 07/11/2022 5  Not Estab. % Final   • Eosinophil Rel % 07/11/2022 2  Not Estab. % Final   • Basophil Rel % 07/11/2022 1  Not Estab. % Final   • Neutrophils Absolute 07/11/2022 6.3  1.4 - 7.0 x10E3/uL Final   • Lymphocytes Absolute 07/11/2022 2.2  0.7 - 3.1 x10E3/uL Final   • Monocytes Absolute 07/11/2022 0.5  0.1 - 0.9 x10E3/uL Final   • Eosinophils Absolute 07/11/2022 0.2  0.0 - 0.4 x10E3/uL Final   • Basophils Absolute 07/11/2022 0.1  0.0 - 0.2 x10E3/uL Final   • Immature Granulocyte Rel % 07/11/2022 0   Not Estab. % Final   • Immature Grans Absolute 07/11/2022 0.0  0.0 - 0.1 x10E3/uL Final   • Glucose 07/11/2022 91  65 - 99 mg/dL Final   • BUN 07/11/2022 25 (A) 6 - 20 mg/dL Final   • Creatinine 07/11/2022 1.01 (A) 0.57 - 1.00 mg/dL Final   • EGFR Result 07/11/2022 74  >59 mL/min/1.73 Final   • BUN/Creatinine Ratio 07/11/2022 25 (A) 9 - 23 Final   • Sodium 07/11/2022 139  134 - 144 mmol/L Final   • Potassium 07/11/2022 4.5  3.5 - 5.2 mmol/L Final   • Chloride 07/11/2022 103  96 - 106 mmol/L Final   • Total CO2 07/11/2022 23  20 - 29 mmol/L Final   • Calcium 07/11/2022 9.8  8.7 - 10.2 mg/dL Final   • Total Protein 07/11/2022 7.0  6.0 - 8.5 g/dL Final   • Albumin 07/11/2022 4.6  3.8 - 4.8 g/dL Final   • Globulin 07/11/2022 2.4  1.5 - 4.5 g/dL Final   • A/G Ratio 07/11/2022 1.9  1.2 - 2.2 Final   • Total Bilirubin 07/11/2022 0.2  0.0 - 1.2 mg/dL Final   • Alkaline Phosphatase 07/11/2022 58  44 - 121 IU/L Final   • AST (SGOT) 07/11/2022 15  0 - 40 IU/L Final   • ALT (SGPT) 07/11/2022 16  0 - 32 IU/L Final   • TSH 07/11/2022 2.400  0.450 - 4.500 uIU/mL Final   • 25 Hydroxy, Vitamin D 07/11/2022 18.6 (A) 30.0 - 100.0 ng/mL Final    Comment: Vitamin D deficiency has been defined by the [place] of  [employer]icine and an Endocrine Society practice guideline as a  level of serum 25-OH vitamin D less than 20 ng/mL (1,2).  The Endocrine Society went on to further define vitamin D  insufficiency as a level between 21 and 29 ng/mL (2).  1. IOM ([place] of [employer]). 2010. Dietary reference     intakes for calcium and D. [place]: The     National Academies Press.  2. Rafa MF, Nimo DENNIS, Lili JUSTIN, et al.     Evaluation, treatment, and prevention of vitamin D     deficiency: an Endocrine Society clinical practice     guideline. JCEM. 2011 Jul; 96(7):1911-30.     • Total Cholesterol 07/11/2022 189  100 - 199 mg/dL Final   • Triglycerides 07/11/2022 93  0 - 149 mg/dL Final   • HDL Cholesterol 07/11/2022 65  >39  mg/dL Final   • VLDL Cholesterol Arturo 07/11/2022 17  5 - 40 mg/dL Final   • LDL Chol Calc ([facility]) 07/11/2022 107 (A) 0 - 99 mg/dL Final   • Vitamin B-12 07/11/2022 558  232 - 1,245 pg/mL Final   • Folate 07/11/2022 9.2  >3.0 ng/mL Final    Comment: A serum folate concentration of less than 3.1 ng/mL is  considered to represent clinical deficiency.           Current Outpatient Medications:   •  albuterol sulfate  (90 Base) MCG/ACT inhaler, Inhale 2 puffs., Disp: , Rfl:   •  amLODIPine (NORVASC) 5 MG tablet, Take 1 tablet by mouth Daily., Disp: 90 tablet, Rfl: 3  •  hydroCHLOROthiazide (HYDRODIURIL) 25 MG tablet, Take 1 tablet by mouth Daily., Disp: 90 tablet, Rfl: 3  •  labetalol (NORMODYNE) 100 MG tablet, Take 1 tablet by mouth 2 (Two) Times a Day for 90 days., Disp: 180 tablet, Rfl: 3  •  lisinopril (PRINIVIL,ZESTRIL) 40 MG tablet, Take 1 tablet by mouth Daily., Disp: 90 tablet, Rfl: 3  •  omeprazole (priLOSEC) 40 MG capsule, Take 1 capsule by mouth Daily., Disp: 90 capsule, Rfl: 3  •  cyclobenzaprine (FLEXERIL) 10 MG tablet, Take 1 tablet by mouth At Night As Needed for Muscle Spasms for up to 15 days., Disp: 15 tablet, Rfl: 0    Diagnoses and all orders for this visit:    1. Healthcare maintenance (Primary)  -     CBC & Differential  -     Comprehensive Metabolic Panel  -     TSH Rfx On Abnormal To Free T4  -     Vitamin D 25 Hydroxy  -     Lipid Panel  -     Vitamin B12  -     Folate    2. Essential hypertension  -     amLODIPine (NORVASC) 5 MG tablet; Take 1 tablet by mouth Daily.  Dispense: 90 tablet; Refill: 3  -     hydroCHLOROthiazide (HYDRODIURIL) 25 MG tablet; Take 1 tablet by mouth Daily.  Dispense: 90 tablet; Refill: 3  -     lisinopril (PRINIVIL,ZESTRIL) 40 MG tablet; Take 1 tablet by mouth Daily.  Dispense: 90 tablet; Refill: 3    3. Insomnia, unspecified type  -     TSH Rfx On Abnormal To Free T4  -     Vitamin B12  -     Folate    Other orders  -     labetalol (NORMODYNE) 100 MG tablet; Take 1  tablet by mouth 2 (Two) Times a Day for 90 days.  Dispense: 180 tablet; Refill: 3    Labs ordered per routine physical examination. If labs come back abnormal, will call patient to discuss results and further treatment plan.   Medications refilled for hypertension management.    Encourage healthy diet and exercise.  Encourage patient to stay up to date on screening examinations as indicated based on age and risk factors. F/U yearly.

## 2022-07-12 DIAGNOSIS — E55.9 VITAMIN D DEFICIENCY: Primary | ICD-10-CM

## 2022-07-12 LAB
25(OH)D3+25(OH)D2 SERPL-MCNC: 18.6 NG/ML (ref 30–100)
ALBUMIN SERPL-MCNC: 4.6 G/DL (ref 3.8–4.8)
ALBUMIN/GLOB SERPL: 1.9 {RATIO} (ref 1.2–2.2)
ALP SERPL-CCNC: 58 IU/L (ref 44–121)
ALT SERPL-CCNC: 16 IU/L (ref 0–32)
AST SERPL-CCNC: 15 IU/L (ref 0–40)
BASOPHILS # BLD AUTO: 0.1 X10E3/UL (ref 0–0.2)
BASOPHILS NFR BLD AUTO: 1 %
BILIRUB SERPL-MCNC: 0.2 MG/DL (ref 0–1.2)
BUN SERPL-MCNC: 25 MG/DL (ref 6–20)
BUN/CREAT SERPL: 25 (ref 9–23)
CALCIUM SERPL-MCNC: 9.8 MG/DL (ref 8.7–10.2)
CHLORIDE SERPL-SCNC: 103 MMOL/L (ref 96–106)
CHOLEST SERPL-MCNC: 189 MG/DL (ref 100–199)
CO2 SERPL-SCNC: 23 MMOL/L (ref 20–29)
CREAT SERPL-MCNC: 1.01 MG/DL (ref 0.57–1)
EGFRCR SERPLBLD CKD-EPI 2021: 74 ML/MIN/1.73
EOSINOPHIL # BLD AUTO: 0.2 X10E3/UL (ref 0–0.4)
EOSINOPHIL NFR BLD AUTO: 2 %
ERYTHROCYTE [DISTWIDTH] IN BLOOD BY AUTOMATED COUNT: 12.4 % (ref 11.7–15.4)
FOLATE SERPL-MCNC: 9.2 NG/ML
GLOBULIN SER CALC-MCNC: 2.4 G/DL (ref 1.5–4.5)
GLUCOSE SERPL-MCNC: 91 MG/DL (ref 65–99)
HCT VFR BLD AUTO: 38.5 % (ref 34–46.6)
HDLC SERPL-MCNC: 65 MG/DL
HGB BLD-MCNC: 12.4 G/DL (ref 11.1–15.9)
IMM GRANULOCYTES # BLD AUTO: 0 X10E3/UL (ref 0–0.1)
IMM GRANULOCYTES NFR BLD AUTO: 0 %
LDLC SERPL CALC-MCNC: 107 MG/DL (ref 0–99)
LYMPHOCYTES # BLD AUTO: 2.2 X10E3/UL (ref 0.7–3.1)
LYMPHOCYTES NFR BLD AUTO: 24 %
MCH RBC QN AUTO: 29.7 PG (ref 26.6–33)
MCHC RBC AUTO-ENTMCNC: 32.2 G/DL (ref 31.5–35.7)
MCV RBC AUTO: 92 FL (ref 79–97)
MONOCYTES # BLD AUTO: 0.5 X10E3/UL (ref 0.1–0.9)
MONOCYTES NFR BLD AUTO: 5 %
NEUTROPHILS # BLD AUTO: 6.3 X10E3/UL (ref 1.4–7)
NEUTROPHILS NFR BLD AUTO: 68 %
PLATELET # BLD AUTO: 340 X10E3/UL (ref 150–450)
POTASSIUM SERPL-SCNC: 4.5 MMOL/L (ref 3.5–5.2)
PROT SERPL-MCNC: 7 G/DL (ref 6–8.5)
RBC # BLD AUTO: 4.17 X10E6/UL (ref 3.77–5.28)
SODIUM SERPL-SCNC: 139 MMOL/L (ref 134–144)
TRIGL SERPL-MCNC: 93 MG/DL (ref 0–149)
TSH SERPL DL<=0.005 MIU/L-ACNC: 2.4 UIU/ML (ref 0.45–4.5)
VIT B12 SERPL-MCNC: 558 PG/ML (ref 232–1245)
VLDLC SERPL CALC-MCNC: 17 MG/DL (ref 5–40)
WBC # BLD AUTO: 9.2 X10E3/UL (ref 3.4–10.8)

## 2022-07-12 RX ORDER — ERGOCALCIFEROL 1.25 MG/1
50000 CAPSULE ORAL WEEKLY
Qty: 5 CAPSULE | Refills: 5 | Status: SHIPPED | OUTPATIENT
Start: 2022-07-12

## 2023-01-06 ENCOUNTER — OFFICE VISIT (OUTPATIENT)
Dept: FAMILY MEDICINE CLINIC | Facility: CLINIC | Age: 38
End: 2023-01-06
Payer: COMMERCIAL

## 2023-01-06 VITALS
TEMPERATURE: 97.3 F | HEIGHT: 65 IN | DIASTOLIC BLOOD PRESSURE: 80 MMHG | BODY MASS INDEX: 30.37 KG/M2 | OXYGEN SATURATION: 98 % | HEART RATE: 86 BPM | SYSTOLIC BLOOD PRESSURE: 118 MMHG | WEIGHT: 182.3 LBS

## 2023-01-06 DIAGNOSIS — J02.9 SORE THROAT: Primary | ICD-10-CM

## 2023-01-06 LAB
EXPIRATION DATE: NORMAL
EXPIRATION DATE: NORMAL
FLUAV AG UPPER RESP QL IA.RAPID: NOT DETECTED
FLUBV AG UPPER RESP QL IA.RAPID: NOT DETECTED
INTERNAL CONTROL: NORMAL
INTERNAL CONTROL: NORMAL
Lab: NORMAL
Lab: NORMAL
S PYO AG THROAT QL: NEGATIVE
SARS-COV-2 AG UPPER RESP QL IA.RAPID: NOT DETECTED

## 2023-01-06 PROCEDURE — 87428 SARSCOV & INF VIR A&B AG IA: CPT | Performed by: FAMILY MEDICINE

## 2023-01-06 PROCEDURE — 99213 OFFICE O/P EST LOW 20 MIN: CPT | Performed by: FAMILY MEDICINE

## 2023-01-06 PROCEDURE — 87880 STREP A ASSAY W/OPTIC: CPT | Performed by: FAMILY MEDICINE

## 2023-01-06 RX ORDER — AMOXICILLIN 500 MG/1
1000 CAPSULE ORAL 2 TIMES DAILY
Qty: 40 CAPSULE | Refills: 0 | Status: SHIPPED | OUTPATIENT
Start: 2023-01-06

## 2023-01-06 NOTE — PROGRESS NOTES
Celine Roque is a 37 y.o. female.     Chief Complaint   Patient presents with   • Sore Throat     Since about Tues, also having congestion        History of Present Illness   Sore throat, congestion for 4 days. Was getting better and now worse. Recently traveled to [place] and was in crowds. Good po.     The following portions of the patient's history were reviewed and updated as appropriate: allergies, current medications, past family history, past medical history, past social history, past surgical history and problem list.    Past Medical History:   Diagnosis Date   • Allergic rhinitis    • Anxiety    • Asthma    • Headache    • History of gastroesophageal reflux (GERD)    • Hypertension    • Kidney stone    • Low back ache    • Migraine        Past Surgical History:   Procedure Laterality Date   •  SECTION  2016   •  SECTION  2017       Family History   Problem Relation Age of Onset   • Hypertension Mother    • Hyperlipidemia Mother    • Alcohol abuse Mother    • Hypertension Father    • Heart disease Father    • Diabetes Father    • Stroke Father    • COPD Father    • Coronary artery disease Father    • Alcohol abuse Maternal Grandmother    • Depression Maternal Grandmother    • Cancer Maternal Grandfather    • Atrial fibrillation Paternal Grandmother    • Arthritis Paternal Grandmother    • Heart disease Paternal Grandfather        Social History     Socioeconomic History   • Marital status:    Tobacco Use   • Smoking status: Every Day     Types: Electronic Cigarette   • Smokeless tobacco: Never   • Tobacco comments:     quit 2016   Substance and Sexual Activity   • Alcohol use: Yes     Comment: rare   • Drug use: No       Review of Systems   Constitutional: Negative for fever.   Respiratory: Negative for shortness of breath.        Objective   Visit Vitals  /80 (BP Location: Left arm, Patient Position: Sitting)   Pulse 86   Temp 97.3 °F (36.3 °C)   Ht  165.1 cm (65\")   Wt 82.7 kg (182 lb 4.8 oz)   SpO2 98%   BMI 30.34 kg/m²     Body mass index is 30.34 kg/m².  Physical Exam  Constitutional:       Appearance: Normal appearance. She is well-developed.   Cardiovascular:      Rate and Rhythm: Normal rate and regular rhythm.      Heart sounds: Normal heart sounds.   Pulmonary:      Effort: Pulmonary effort is normal.      Breath sounds: Normal breath sounds.   Musculoskeletal:         General: No swelling. Normal range of motion.   Skin:     General: Skin is warm and dry.      Findings: No rash.   Neurological:      General: No focal deficit present.      Mental Status: She is alert and oriented to person, place, and time.   Psychiatric:         Mood and Affect: Mood normal.         Behavior: Behavior normal.           Assessment & Plan   Diagnoses and all orders for this visit:    1. Sore throat (Primary)  -     POCT SARS-CoV-2 Antigen ALESHIA  -     POC Rapid Strep A  -     amoxicillin (AMOXIL) 500 MG capsule; Take 2 capsules by mouth 2 (Two) Times a Day.  Dispense: 40 capsule; Refill: 0        Rest, fluids and follow up if worse or no better.

## 2023-07-03 PROBLEM — E55.9 VITAMIN D DEFICIENCY: Status: ACTIVE | Noted: 2023-07-03

## 2023-12-13 ENCOUNTER — TELEMEDICINE (OUTPATIENT)
Dept: FAMILY MEDICINE CLINIC | Facility: CLINIC | Age: 38
End: 2023-12-13
Payer: COMMERCIAL

## 2023-12-13 DIAGNOSIS — H10.32 ACUTE BACTERIAL CONJUNCTIVITIS OF LEFT EYE: Primary | ICD-10-CM

## 2023-12-13 PROCEDURE — 99213 OFFICE O/P EST LOW 20 MIN: CPT | Performed by: FAMILY MEDICINE

## 2023-12-13 RX ORDER — CIPROFLOXACIN HYDROCHLORIDE 3.5 MG/ML
1 SOLUTION/ DROPS TOPICAL
Qty: 5 ML | Refills: 0 | Status: SHIPPED | OUTPATIENT
Start: 2023-12-13 | End: 2023-12-21

## 2023-12-13 NOTE — PROGRESS NOTES
L eye redness.    Subjective   Diamante Roque is a 38 y.o. female.     History of Present Illness   Video visit due to patient unable to come to the office due to transportation issues.  Consent obtained.  Patient is at home.  Providers in the office.  7-minute visit.  Patient complains of left eye redness with matting this morning.  No fever or chills.  No ill contacts.  No eye pain with light or sensitivity.    The following portions of the patient's history were reviewed and updated as appropriate: allergies, current medications, past family history, past medical history, past social history, past surgical history and problem list.    Review of Systems   Constitutional:  Negative for fever.   Eyes:  Positive for redness and itching. Negative for blurred vision, double vision and pain.       Patient Active Problem List   Diagnosis    Essential hypertension    Grief reaction    Anxiety    Environmental and seasonal allergies    Gastroesophageal reflux disease without esophagitis    Neck pain on left side    Healthcare maintenance    Vitamin D deficiency    Acute bacterial conjunctivitis of left eye       Allergies   Allergen Reactions    Cat Hair Extract          Current Outpatient Medications:     albuterol sulfate  (90 Base) MCG/ACT inhaler, Inhale 2 puffs., Disp: , Rfl:     amLODIPine (NORVASC) 5 MG tablet, Take 1 tablet by mouth Daily., Disp: 90 tablet, Rfl: 3    cyclobenzaprine (FLEXERIL) 10 MG tablet, Take 1 tablet by mouth At Night As Needed for Muscle Spasms for up to 90 doses., Disp: 90 tablet, Rfl: 0    hydroCHLOROthiazide (HYDRODIURIL) 25 MG tablet, Take 1 tablet by mouth Daily., Disp: 90 tablet, Rfl: 3    labetalol (NORMODYNE) 100 MG tablet, Take 1 tablet by mouth 2 (Two) Times a Day for 360 days., Disp: 180 tablet, Rfl: 3    lisinopril (PRINIVIL,ZESTRIL) 40 MG tablet, Take 1 tablet by mouth Daily., Disp: 90 tablet, Rfl: 3    omeprazole (priLOSEC) 40 MG capsule, Take 1 capsule by mouth Daily., Disp:  90 capsule, Rfl: 3    ondansetron (Zofran) 4 MG tablet, Take 1 tablet by mouth Every 8 (Eight) Hours As Needed for Nausea or Vomiting., Disp: 30 tablet, Rfl: 1    vitamin D (ERGOCALCIFEROL) 1.25 MG (98643 UT) capsule capsule, Take 1 capsule by mouth 1 (One) Time Per Week., Disp: 5 capsule, Rfl: 5    Past Medical History:   Diagnosis Date    Allergic rhinitis     Anxiety     Asthma     Headache     History of gastroesophageal reflux (GERD)     Hypertension     Kidney stone     Low back ache     Migraine        Past Surgical History:   Procedure Laterality Date     SECTION  2016     SECTION  2017       Family History   Problem Relation Age of Onset    Hypertension Mother     Hyperlipidemia Mother     Alcohol abuse Mother     Hypertension Father     Heart disease Father     Diabetes Father     Stroke Father     COPD Father     Coronary artery disease Father     Alcohol abuse Maternal Grandmother     Depression Maternal Grandmother     Cancer Maternal Grandfather     Atrial fibrillation Paternal Grandmother     Arthritis Paternal Grandmother     Heart disease Paternal Grandfather        Social History     Tobacco Use    Smoking status: Every Day     Types: Electronic Cigarette    Smokeless tobacco: Never    Tobacco comments:     quit 2016   Substance Use Topics    Alcohol use: Yes     Comment: rare            Objective     There were no vitals filed for this visit.  There is no height or weight on file to calculate BMI.    Physical Exam  Constitutional:       Appearance: She is well-developed.   HENT:      Head: Normocephalic and atraumatic.   Eyes:      Comments: L eye with bulbar conjunctival injection with corneal limbus sparing.    Pulmonary:      Breath sounds: Normal breath sounds.   Neurological:      Mental Status: She is alert and oriented to person, place, and time.   Psychiatric:         Behavior: Behavior normal.         Thought Content: Thought content normal.         Judgment:  Judgment normal.         Lab Results   Component Value Date    GLUCOSE 86 07/03/2023    BUN 15 07/03/2023    CREATININE 1.09 (H) 07/03/2023    EGFRIFNONA 69 08/11/2021    EGFRIFAFRI 84 08/11/2021    BCR 13.8 07/03/2023    K 4.4 07/03/2023    CO2 26.9 07/03/2023    CALCIUM 10.4 07/03/2023    PROTENTOTREF 7.1 07/03/2023    ALBUMIN 4.6 07/03/2023    LABIL2 1.8 07/03/2023    AST 18 07/03/2023    ALT 20 07/03/2023       WBC   Date Value Ref Range Status   07/03/2023 8.97 3.40 - 10.80 10*3/mm3 Final     RBC   Date Value Ref Range Status   07/03/2023 4.10 3.77 - 5.28 10*6/mm3 Final     Hemoglobin   Date Value Ref Range Status   07/03/2023 12.3 12.0 - 15.9 g/dL Final     Hematocrit   Date Value Ref Range Status   07/03/2023 37.2 34.0 - 46.6 % Final     MCV   Date Value Ref Range Status   07/03/2023 90.7 79.0 - 97.0 fL Final     MCH   Date Value Ref Range Status   07/03/2023 30.0 26.6 - 33.0 pg Final     MCHC   Date Value Ref Range Status   07/03/2023 33.1 31.5 - 35.7 g/dL Final     RDW   Date Value Ref Range Status   07/03/2023 12.3 12.3 - 15.4 % Final     Platelets   Date Value Ref Range Status   07/03/2023 365 140 - 450 10*3/mm3 Final     Neutrophil Rel %   Date Value Ref Range Status   07/03/2023 54.7 42.7 - 76.0 % Final     Lymphocyte Rel %   Date Value Ref Range Status   07/03/2023 31.1 19.6 - 45.3 % Final     Monocyte Rel %   Date Value Ref Range Status   07/03/2023 6.4 5.0 - 12.0 % Final     Eosinophil Rel %   Date Value Ref Range Status   07/03/2023 6.4 (H) 0.3 - 6.2 % Final     Basophil Rel %   Date Value Ref Range Status   07/03/2023 0.8 0.0 - 1.5 % Final     Neutrophils Absolute   Date Value Ref Range Status   07/03/2023 4.92 1.70 - 7.00 10*3/mm3 Final     Lymphocytes Absolute   Date Value Ref Range Status   07/03/2023 2.79 0.70 - 3.10 10*3/mm3 Final     Monocytes Absolute   Date Value Ref Range Status   07/03/2023 0.57 0.10 - 0.90 10*3/mm3 Final     Eosinophils Absolute   Date Value Ref Range Status   07/03/2023  "0.57 (H) 0.00 - 0.40 10*3/mm3 Final     Basophils Absolute   Date Value Ref Range Status   07/03/2023 0.07 0.00 - 0.20 10*3/mm3 Final     nRBC   Date Value Ref Range Status   07/03/2023 0.0 0.0 - 0.2 /100 WBC Final       No results found for: \"HGBA1C\"    Lab Results   Component Value Date    XYSFWQAT50 558 07/11/2022       TSH   Date Value Ref Range Status   07/11/2022 2.400 0.450 - 4.500 uIU/mL Final       No results found for: \"CHOL\"  Lab Results   Component Value Date    TRIG 192 (H) 07/03/2023     Lab Results   Component Value Date    HDL 67 (H) 07/03/2023     Lab Results   Component Value Date     (H) 07/03/2023     Lab Results   Component Value Date    VLDL 33 07/03/2023     No results found for: \"LDLHDL\"      Procedures    Assessment & Plan   Problems Addressed this Visit       Acute bacterial conjunctivitis of left eye - Primary     Diagnoses         Codes Comments    Acute bacterial conjunctivitis of left eye    -  Primary ICD-10-CM: H10.32  ICD-9-CM: 372.03         Cipro eye drop rx given.  Warm compresses TID.      No orders of the defined types were placed in this encounter.      Current Outpatient Medications   Medication Sig Dispense Refill    albuterol sulfate  (90 Base) MCG/ACT inhaler Inhale 2 puffs.      amLODIPine (NORVASC) 5 MG tablet Take 1 tablet by mouth Daily. 90 tablet 3    cyclobenzaprine (FLEXERIL) 10 MG tablet Take 1 tablet by mouth At Night As Needed for Muscle Spasms for up to 90 doses. 90 tablet 0    hydroCHLOROthiazide (HYDRODIURIL) 25 MG tablet Take 1 tablet by mouth Daily. 90 tablet 3    labetalol (NORMODYNE) 100 MG tablet Take 1 tablet by mouth 2 (Two) Times a Day for 360 days. 180 tablet 3    lisinopril (PRINIVIL,ZESTRIL) 40 MG tablet Take 1 tablet by mouth Daily. 90 tablet 3    omeprazole (priLOSEC) 40 MG capsule Take 1 capsule by mouth Daily. 90 capsule 3    ondansetron (Zofran) 4 MG tablet Take 1 tablet by mouth Every 8 (Eight) Hours As Needed for Nausea or " Vomiting. 30 tablet 1    vitamin D (ERGOCALCIFEROL) 1.25 MG (08642 UT) capsule capsule Take 1 capsule by mouth 1 (One) Time Per Week. 5 capsule 5     No current facility-administered medications for this visit.       Diamante Ezioayala had no medications administered during this visit.    No follow-ups on file.    There are no Patient Instructions on file for this visit.

## 2024-01-17 ENCOUNTER — OFFICE VISIT (OUTPATIENT)
Dept: FAMILY MEDICINE CLINIC | Facility: CLINIC | Age: 39
End: 2024-01-17
Payer: COMMERCIAL

## 2024-01-17 VITALS
TEMPERATURE: 97.7 F | HEART RATE: 85 BPM | HEIGHT: 65 IN | WEIGHT: 188.8 LBS | OXYGEN SATURATION: 99 % | SYSTOLIC BLOOD PRESSURE: 122 MMHG | DIASTOLIC BLOOD PRESSURE: 78 MMHG | BODY MASS INDEX: 31.46 KG/M2

## 2024-01-17 DIAGNOSIS — E55.9 VITAMIN D DEFICIENCY: ICD-10-CM

## 2024-01-17 DIAGNOSIS — H10.32 ACUTE BACTERIAL CONJUNCTIVITIS OF LEFT EYE: Primary | ICD-10-CM

## 2024-01-17 DIAGNOSIS — F41.9 ANXIETY: ICD-10-CM

## 2024-01-17 DIAGNOSIS — I10 ESSENTIAL HYPERTENSION: ICD-10-CM

## 2024-01-17 DIAGNOSIS — K21.9 GASTROESOPHAGEAL REFLUX DISEASE WITHOUT ESOPHAGITIS: ICD-10-CM

## 2024-01-17 DIAGNOSIS — J30.89 ENVIRONMENTAL AND SEASONAL ALLERGIES: ICD-10-CM

## 2024-01-17 PROCEDURE — 99214 OFFICE O/P EST MOD 30 MIN: CPT | Performed by: FAMILY MEDICINE

## 2024-01-17 RX ORDER — HYDROCHLOROTHIAZIDE 25 MG/1
25 TABLET ORAL DAILY
Qty: 90 TABLET | Refills: 3 | Status: SHIPPED | OUTPATIENT
Start: 2024-01-17

## 2024-01-17 RX ORDER — ERGOCALCIFEROL 1.25 MG/1
50000 CAPSULE ORAL WEEKLY
Qty: 5 CAPSULE | Refills: 5 | Status: SHIPPED | OUTPATIENT
Start: 2024-01-17

## 2024-01-17 RX ORDER — ERYTHROMYCIN 5 MG/G
OINTMENT OPHTHALMIC
Qty: 3.5 G | Refills: 0 | Status: SHIPPED | OUTPATIENT
Start: 2024-01-17

## 2024-01-17 RX ORDER — LABETALOL 100 MG/1
100 TABLET, FILM COATED ORAL 2 TIMES DAILY
Qty: 180 TABLET | Refills: 3 | Status: SHIPPED | OUTPATIENT
Start: 2024-01-17 | End: 2025-01-11

## 2024-01-17 RX ORDER — AMLODIPINE BESYLATE 5 MG/1
5 TABLET ORAL DAILY
Qty: 90 TABLET | Refills: 3 | Status: SHIPPED | OUTPATIENT
Start: 2024-01-17

## 2024-01-17 RX ORDER — LISINOPRIL 40 MG/1
40 TABLET ORAL DAILY
Qty: 90 TABLET | Refills: 3 | Status: SHIPPED | OUTPATIENT
Start: 2024-01-17

## 2024-01-17 NOTE — PROGRESS NOTES
Subjective   Diamante Roque is a 38 y.o. female.     Chief Complaint   Patient presents with    Conjunctivitis     Keeps coming back     hpi   htn- doing well on meds, has worked on weight loss.     Anxiety- doing well, not on meds    Allergies- stable, uses otc meds prn. Does not think her allergies is causing her eye redness.     gerd- Doing well on meds daily.      Vit d def- taking meds and wanting to see if she has to keep taking it    Having recurrent pink eye. Got Cipro for this about a month ago.     The following portions of the patient's history were reviewed and updated as appropriate: allergies, current medications, past family history, past medical history, past social history, past surgical history and problem list.    Past Medical History:   Diagnosis Date    Allergic     Cat    Allergic rhinitis     Anxiety     Asthma     Headache     History of gastroesophageal reflux (GERD)     Hypertension     Kidney stone     Low back ache     Migraine        Past Surgical History:   Procedure Laterality Date     SECTION  2016     SECTION  2017       Family History   Problem Relation Age of Onset    Hypertension Mother     Hyperlipidemia Mother     Alcohol abuse Mother     Hypertension Father     Heart disease Father     Diabetes Father     Stroke Father     COPD Father     Coronary artery disease Father     Alcohol abuse Maternal Grandmother     Depression Maternal Grandmother     Cancer Maternal Grandfather     Atrial fibrillation Paternal Grandmother     Arthritis Paternal Grandmother     Heart disease Paternal Grandfather        Social History     Socioeconomic History    Marital status:    Tobacco Use    Smoking status: Some Days     Types: Electronic Cigarette    Smokeless tobacco: Never    Tobacco comments:     quit 2016   Substance and Sexual Activity    Alcohol use: Yes     Comment: rare    Drug use: No    Sexual activity: Yes     Birth control/protection: Tubal ligation  "      Review of Systems   Respiratory:  Negative for shortness of breath.    Cardiovascular:  Negative for chest pain.       Objective   Visit Vitals  /78 (BP Location: Left arm, Patient Position: Sitting, Cuff Size: Adult)   Pulse 85   Temp 97.7 °F (36.5 °C)   Ht 165.1 cm (65\")   Wt 85.6 kg (188 lb 12.8 oz)   LMP 12/15/2023 (Approximate)   SpO2 99%   BMI 31.42 kg/m²       Body mass index is 31.42 kg/m².  Physical Exam   Constitutional: She is oriented to person, place, and time. She appears well-developed and well-nourished.   Cardiovascular: Normal rate, regular rhythm and normal heart sounds.   Pulmonary/Chest: Effort normal and breath sounds normal.   Abdominal: Normal appearance.   Musculoskeletal: Normal range of motion.   Neurological: She is alert and oriented to person, place, and time.   Skin: Skin is warm and dry.   Psychiatric: Her behavior is normal. Mood normal.         Assessment & Plan   Diagnoses and all orders for this visit:    1. Acute bacterial conjunctivitis of left eye (Primary)  -     erythromycin (ROMYCIN) 5 MG/GM ophthalmic ointment; Apply one cm ribbon in affected eye up to 6 times a day for 7-10 days.  Dispense: 3.5 g; Refill: 0  -     Ambulatory Referral to Ophthalmology    2. Essential hypertension  -     CBC & Differential  -     Comprehensive Metabolic Panel  -     Lipid Panel  -     amLODIPine (NORVASC) 5 MG tablet; Take 1 tablet by mouth Daily.  Dispense: 90 tablet; Refill: 3  -     hydroCHLOROthiazide (HYDRODIURIL) 25 MG tablet; Take 1 tablet by mouth Daily.  Dispense: 90 tablet; Refill: 3  -     lisinopril (PRINIVIL,ZESTRIL) 40 MG tablet; Take 1 tablet by mouth Daily.  Dispense: 90 tablet; Refill: 3    3. Environmental and seasonal allergies    4. Vitamin D deficiency  -     Vitamin D,25-Hydroxy  -     vitamin D (ERGOCALCIFEROL) 1.25 MG (51107 UT) capsule capsule; Take 1 capsule by mouth 1 (One) Time Per Week.  Dispense: 5 capsule; Refill: 5    5. Gastroesophageal reflux " disease without esophagitis    6. Anxiety    Other orders  -     labetalol (NORMODYNE) 100 MG tablet; Take 1 tablet by mouth 2 (Two) Times a Day for 360 days.  Dispense: 180 tablet; Refill: 3            Doing well, cont meds, f/u in 6-12 months.  Will let her know if she has to stay on vit d. F/u if worse or no better.

## 2024-01-18 LAB
25(OH)D3+25(OH)D2 SERPL-MCNC: 43.9 NG/ML (ref 30–100)
ALBUMIN SERPL-MCNC: 4.7 G/DL (ref 3.5–5.2)
ALBUMIN/GLOB SERPL: 2.2 G/DL
ALP SERPL-CCNC: 63 U/L (ref 39–117)
ALT SERPL-CCNC: 14 U/L (ref 1–33)
AST SERPL-CCNC: 12 U/L (ref 1–32)
BASOPHILS # BLD AUTO: 0.06 10*3/MM3 (ref 0–0.2)
BASOPHILS NFR BLD AUTO: 0.6 % (ref 0–1.5)
BILIRUB SERPL-MCNC: <0.2 MG/DL (ref 0–1.2)
BUN SERPL-MCNC: 20 MG/DL (ref 6–20)
BUN/CREAT SERPL: 16.7 (ref 7–25)
CALCIUM SERPL-MCNC: 10 MG/DL (ref 8.6–10.5)
CHLORIDE SERPL-SCNC: 103 MMOL/L (ref 98–107)
CHOLEST SERPL-MCNC: 211 MG/DL (ref 0–200)
CO2 SERPL-SCNC: 24.6 MMOL/L (ref 22–29)
CREAT SERPL-MCNC: 1.2 MG/DL (ref 0.57–1)
EGFRCR SERPLBLD CKD-EPI 2021: 59.2 ML/MIN/1.73
EOSINOPHIL # BLD AUTO: 0.34 10*3/MM3 (ref 0–0.4)
EOSINOPHIL NFR BLD AUTO: 3.6 % (ref 0.3–6.2)
ERYTHROCYTE [DISTWIDTH] IN BLOOD BY AUTOMATED COUNT: 12.7 % (ref 12.3–15.4)
GLOBULIN SER CALC-MCNC: 2.1 GM/DL
GLUCOSE SERPL-MCNC: 89 MG/DL (ref 65–99)
HCT VFR BLD AUTO: 39.2 % (ref 34–46.6)
HDLC SERPL-MCNC: 57 MG/DL (ref 40–60)
HGB BLD-MCNC: 13.1 G/DL (ref 12–15.9)
IMM GRANULOCYTES # BLD AUTO: 0.02 10*3/MM3 (ref 0–0.05)
IMM GRANULOCYTES NFR BLD AUTO: 0.2 % (ref 0–0.5)
LDLC SERPL CALC-MCNC: 135 MG/DL (ref 0–100)
LYMPHOCYTES # BLD AUTO: 2.66 10*3/MM3 (ref 0.7–3.1)
LYMPHOCYTES NFR BLD AUTO: 27.9 % (ref 19.6–45.3)
MCH RBC QN AUTO: 30.8 PG (ref 26.6–33)
MCHC RBC AUTO-ENTMCNC: 33.4 G/DL (ref 31.5–35.7)
MCV RBC AUTO: 92 FL (ref 79–97)
MONOCYTES # BLD AUTO: 0.5 10*3/MM3 (ref 0.1–0.9)
MONOCYTES NFR BLD AUTO: 5.3 % (ref 5–12)
NEUTROPHILS # BLD AUTO: 5.94 10*3/MM3 (ref 1.7–7)
NEUTROPHILS NFR BLD AUTO: 62.4 % (ref 42.7–76)
NRBC BLD AUTO-RTO: 0 /100 WBC (ref 0–0.2)
PLATELET # BLD AUTO: 338 10*3/MM3 (ref 140–450)
POTASSIUM SERPL-SCNC: 4.6 MMOL/L (ref 3.5–5.2)
PROT SERPL-MCNC: 6.8 G/DL (ref 6–8.5)
RBC # BLD AUTO: 4.26 10*6/MM3 (ref 3.77–5.28)
SODIUM SERPL-SCNC: 140 MMOL/L (ref 136–145)
TRIGL SERPL-MCNC: 104 MG/DL (ref 0–150)
VLDLC SERPL CALC-MCNC: 19 MG/DL (ref 5–40)
WBC # BLD AUTO: 9.52 10*3/MM3 (ref 3.4–10.8)

## 2024-01-22 DIAGNOSIS — H10.32 ACUTE BACTERIAL CONJUNCTIVITIS OF LEFT EYE: ICD-10-CM

## 2024-01-22 NOTE — TELEPHONE ENCOUNTER
Caller: Diamante Roque    Relationship: Self    Best call back number: [phone]     Requested Prescriptions:   Requested Prescriptions     Pending Prescriptions Disp Refills    erythromycin (ROMYCIN) 5 MG/GM ophthalmic ointment 3.5 g 0     Sig: Apply one cm ribbon in affected eye up to 6 times a day for 7-10 days.        Pharm[facility] where request [facility] be sent[id number] [address] [facility] [address] [facility] [place] [phone] [facility] [phone]      Last office visit with prescribing clinician: 1/17/2024   Last telemedicine visit with prescribing clinician: 12/13/2023   Next office visit with prescribing clinician: Visit date not found     Additional details provided by patient: PATIENT STATES HER DOG ATE THE PRESCRIPTION SHE WAS GIVEN LAST WEEK AND IS REQUESTING DR. DUNCAN SEND IN ANOTHER PRESCRIPTION FOR THE OINTMENT.     Does the patient have less than a 3 day supply:  [x] Yes  [] No    Would you like a call back once the refill request has been completed: [] Yes [x] No    Tiffany Joel Rep   01/22/24 15:38 EST

## 2024-01-23 RX ORDER — ERYTHROMYCIN 5 MG/G
OINTMENT OPHTHALMIC
Qty: 3.5 G | Refills: 0 | Status: SHIPPED | OUTPATIENT
Start: 2024-01-23

## 2024-01-29 ENCOUNTER — TELEPHONE (OUTPATIENT)
Dept: FAMILY MEDICINE CLINIC | Facility: CLINIC | Age: 39
End: 2024-01-29

## 2024-01-29 NOTE — TELEPHONE ENCOUNTER
Caller: Diamante Roque    Relationship: Self    Best call back number: [phone]     Who are you requesting to speak with (clinical staff, provider,  specific staff member): PRACTICE MANAGER     What was the call regarding: PATIENT NEEDS A CODE ADJUSTED FOR HER VISIT ON 1/17/2024    THE CURRENT CODE IS 185[address] AND NEEDS TO BE CHANGED TO 99480 OR 89095. PLEASE CALL TO ADVISE.

## 2024-12-26 ENCOUNTER — OFFICE VISIT (OUTPATIENT)
Dept: FAMILY MEDICINE CLINIC | Facility: CLINIC | Age: 39
End: 2024-12-26
Payer: COMMERCIAL

## 2024-12-26 VITALS
HEART RATE: 75 BPM | DIASTOLIC BLOOD PRESSURE: 82 MMHG | WEIGHT: 182 LBS | SYSTOLIC BLOOD PRESSURE: 100 MMHG | BODY MASS INDEX: 30.32 KG/M2 | HEIGHT: 65 IN | TEMPERATURE: 97.5 F | OXYGEN SATURATION: 100 %

## 2024-12-26 DIAGNOSIS — I10 ESSENTIAL HYPERTENSION: ICD-10-CM

## 2024-12-26 DIAGNOSIS — M54.2 NECK PAIN ON LEFT SIDE: ICD-10-CM

## 2024-12-26 DIAGNOSIS — R11.0 NAUSEA: Primary | ICD-10-CM

## 2024-12-26 PROBLEM — M22.41 CHONDROMALACIA OF RIGHT PATELLA: Status: ACTIVE | Noted: 2024-07-29

## 2024-12-26 PROBLEM — M25.461 EFFUSION OF RIGHT KNEE: Status: ACTIVE | Noted: 2024-07-12

## 2024-12-26 PROCEDURE — 99213 OFFICE O/P EST LOW 20 MIN: CPT

## 2024-12-26 RX ORDER — AMLODIPINE BESYLATE 5 MG/1
5 TABLET ORAL DAILY
Qty: 90 TABLET | Refills: 0 | Status: SHIPPED | OUTPATIENT
Start: 2024-12-26

## 2024-12-26 RX ORDER — LISINOPRIL 40 MG/1
40 TABLET ORAL DAILY
Qty: 90 TABLET | Refills: 0 | Status: SHIPPED | OUTPATIENT
Start: 2024-12-26

## 2024-12-26 RX ORDER — ONDANSETRON 4 MG/1
4 TABLET, FILM COATED ORAL EVERY 8 HOURS PRN
Qty: 30 TABLET | Refills: 1 | Status: SHIPPED | OUTPATIENT
Start: 2024-12-26 | End: 2024-12-26

## 2024-12-26 RX ORDER — HYDROCHLOROTHIAZIDE 25 MG/1
25 TABLET ORAL DAILY
Qty: 90 TABLET | Refills: 0 | Status: SHIPPED | OUTPATIENT
Start: 2024-12-26

## 2024-12-26 RX ORDER — CYCLOBENZAPRINE HCL 10 MG
10 TABLET ORAL NIGHTLY PRN
Qty: 90 TABLET | Refills: 0 | Status: SHIPPED | OUTPATIENT
Start: 2024-12-26

## 2024-12-26 RX ORDER — ONDANSETRON 4 MG/1
4 TABLET, ORALLY DISINTEGRATING ORAL EVERY 8 HOURS PRN
Qty: 30 TABLET | Refills: 3 | Status: SHIPPED | OUTPATIENT
Start: 2024-12-26

## 2024-12-26 NOTE — PROGRESS NOTES
"Chief Complaint  Hypertension    Subjective          History of Present Illness        HTN  Chronic, ongoing, well controlled.  Current medication regimen Norvasc lisinopril hctz.  BP Readings from Last 3 Encounters:   12/26/24 100/82   01/17/24 122/78   07/03/23 114/82       Nausea  Chronic, ongoing, well controlled.  Current medication regimen zofran prn.    Neck pain/knee pain  Chronic, ongoing, well controlled.  Current medication regimen flexeril prn.    Objective   Vital Signs:  /82 (BP Location: Right arm, Patient Position: Sitting, Cuff Size: Adult)   Pulse 75   Temp 97.5 °F (36.4 °C) (Temporal)   Ht 165.1 cm (65\")   Wt 82.6 kg (182 lb)   SpO2 100%   BMI 30.29 kg/m²   Estimated body mass index is 30.29 kg/m² as calculated from the following:    Height as of this encounter: 165.1 cm (65\").    Weight as of this encounter: 82.6 kg (182 lb).      Physical Exam  Vitals reviewed.   Constitutional:       General: She is not in acute distress.     Appearance: Normal appearance.   HENT:      Head: Normocephalic and atraumatic.      Mouth/Throat:      Mouth: Mucous membranes are moist.      Pharynx: No oropharyngeal exudate or posterior oropharyngeal erythema.   Eyes:      Conjunctiva/sclera: Conjunctivae normal.      Pupils: Pupils are equal, round, and reactive to light.   Cardiovascular:      Rate and Rhythm: Normal rate and regular rhythm.      Pulses: Normal pulses.      Heart sounds: No murmur heard.     No gallop.   Pulmonary:      Effort: Pulmonary effort is normal. No respiratory distress.      Breath sounds: Normal breath sounds. No wheezing.   Abdominal:      General: Bowel sounds are normal. There is no distension.      Palpations: Abdomen is soft.      Tenderness: There is no abdominal tenderness.   Musculoskeletal:         General: Normal range of motion.      Cervical back: Normal range of motion and neck supple. No tenderness.      Right lower leg: No edema.      Left lower leg: No edema. "   Skin:     General: Skin is warm and dry.   Neurological:      Mental Status: She is alert and oriented to person, place, and time. Mental status is at baseline.   Psychiatric:         Mood and Affect: Mood normal.              Result Review :                Assessment and Plan   Diagnoses and all orders for this visit:    1. Nausea (Primary)  -     ondansetron (Zofran) 4 MG tablet; Take 1 tablet by mouth Every 8 (Eight) Hours As Needed for Nausea or Vomiting.  Dispense: 30 tablet; Refill: 1    2. Essential hypertension  -     hydroCHLOROthiazide 25 MG tablet; Take 1 tablet by mouth Daily.  Dispense: 90 tablet; Refill: 0  -     amLODIPine (NORVASC) 5 MG tablet; Take 1 tablet by mouth Daily.  Dispense: 90 tablet; Refill: 0  -     lisinopril (PRINIVIL,ZESTRIL) 40 MG tablet; Take 1 tablet by mouth Daily.  Dispense: 90 tablet; Refill: 0    3. Neck pain on left side  -     cyclobenzaprine (FLEXERIL) 10 MG tablet; Take 1 tablet by mouth At Night As Needed for Muscle Spasms for up to 90 doses.  Dispense: 90 tablet; Refill: 0    Htn, chronic, well controlled continue medication regimen.  Nausea well controlled with prn zofran.  Neck pain/knee pain well controlled with flexeril prn.         Follow Up   Return in about 6 months (around 6/26/2025), or if symptoms worsen or fail to improve, for Annual physical.  Patient was given instructions and counseling regarding her condition or for health maintenance advice. Please see specific information pulled into the AVS if appropriate.

## 2025-01-21 DIAGNOSIS — I10 ESSENTIAL HYPERTENSION: ICD-10-CM

## 2025-01-22 RX ORDER — HYDROCHLOROTHIAZIDE 25 MG/1
25 TABLET ORAL DAILY
Qty: 90 TABLET | Refills: 0 | Status: SHIPPED | OUTPATIENT
Start: 2025-01-22

## 2025-01-22 RX ORDER — AMLODIPINE BESYLATE 5 MG/1
5 TABLET ORAL DAILY
Qty: 90 TABLET | Refills: 0 | Status: SHIPPED | OUTPATIENT
Start: 2025-01-22

## 2025-01-22 RX ORDER — LISINOPRIL 40 MG/1
40 TABLET ORAL DAILY
Qty: 90 TABLET | Refills: 0 | Status: SHIPPED | OUTPATIENT
Start: 2025-01-22

## 2025-03-03 ENCOUNTER — OFFICE VISIT (OUTPATIENT)
Dept: FAMILY MEDICINE CLINIC | Facility: CLINIC | Age: 40
End: 2025-03-03
Payer: COMMERCIAL

## 2025-03-03 VITALS
WEIGHT: 182 LBS | HEIGHT: 65 IN | OXYGEN SATURATION: 100 % | TEMPERATURE: 97.3 F | DIASTOLIC BLOOD PRESSURE: 76 MMHG | BODY MASS INDEX: 30.32 KG/M2 | SYSTOLIC BLOOD PRESSURE: 110 MMHG | HEART RATE: 82 BPM

## 2025-03-03 DIAGNOSIS — R10.13 EPIGASTRIC PAIN: Primary | ICD-10-CM

## 2025-03-03 PROCEDURE — 99213 OFFICE O/P EST LOW 20 MIN: CPT | Performed by: NURSE PRACTITIONER

## 2025-03-03 NOTE — PROGRESS NOTES
"Chief Complaint  Heartburn (Pt. Had an issue over the weekend that almost took her to the hospital.  Pt. Wanting a gastro referral) and Nausea    Subjective        Hope Mya presents to [facility]  Heartburn  She complains of nausea.   Nausea  Symptoms include nausea.      Patient is concerned for gallstones. She had an episode of epigastric pain so intense and radiated to her back, she was worried for serious issues on Thursday night. She took tums, with no improvement. She vomited and she was going to go to the hospital but the pain went away so she postponed going to ER. She has had a history of stomach ulcer and is maintaining with daily prilosec. She is thinking she might have less intense episodes of this same pain but not as intense. She did try to call GI and make an appointment but they need a referral. She is having some lingering nausea after eating and self treating with zofran.   Objective   Vital Signs:  /76 (BP Location: Left arm, Patient Position: Sitting, Cuff Size: Adult)   Pulse 82   Temp 97.3 °F (36.3 °C)   Ht 165.1 cm (65\")   Wt 82.6 kg (182 lb)   SpO2 100%   BMI 30.29 kg/m²   Estimated body mass index is 30.29 kg/m² as calculated from the following:    Height as of this encounter: 165.1 cm (65\").    Weight as of this encounter: 82.6 kg (182 lb).            Physical Exam  Constitutional:       Appearance: Normal appearance. She is overweight.   HENT:      Head: Normocephalic.      Nose: Nose normal.   Eyes:      Extraocular Movements: Extraocular movements intact.      Pupils: Pupils are equal, round, and reactive to light.   Cardiovascular:      Rate and Rhythm: Normal rate and regular rhythm.      Heart sounds: Normal heart sounds.   Pulmonary:      Effort: Pulmonary effort is normal.      Breath sounds: Normal breath sounds.   Abdominal:      Tenderness:  in the epigastric area   Musculoskeletal:         General: Normal range of motion.      " Cervical back: Normal range of motion.   Skin:     General: Skin is warm and dry.   Neurological:      General: No focal deficit present.      Mental Status: She is alert and oriented to person, place, and time.   Psychiatric:         Mood and Affect: Mood normal.        Result Review :                Assessment and Plan   Diagnoses and all orders for this visit:    1. Epigastric pain (Primary)  -     US Abdomen Limited; Future  -     Ambulatory Referral to Gastroenterology; Future    Discussed eating a low fat diet to possibly prevent future abdomen pain.          Follow Up   Return if symptoms worsen or fail to improve.  Patient was given instructions and counseling regarding her condition or for health maintenance advice. Please see specific information pulled into the AVS if appropriate.

## 2025-03-12 ENCOUNTER — HOSPITAL ENCOUNTER (OUTPATIENT)
Facility: HOSPITAL | Age: 40
Discharge: HOME | End: 2025-03-12
Admitting: NURSE PRACTITIONER
Payer: COMMERCIAL

## 2025-03-12 DIAGNOSIS — R10.13 EPIGASTRIC PAIN: ICD-10-CM

## 2025-03-12 PROCEDURE — 76705 ECHO EXAM OF ABDOMEN: CPT

## 2025-03-14 DIAGNOSIS — K80.20 GALLSTONES: Primary | ICD-10-CM

## 2025-04-24 ENCOUNTER — OFFICE VISIT (OUTPATIENT)
Dept: OBSTETRICS AND GYNECOLOGY | Age: 40
End: 2025-04-24
Payer: COMMERCIAL

## 2025-04-24 VITALS
BODY MASS INDEX: 29.32 KG/M2 | HEIGHT: 65 IN | DIASTOLIC BLOOD PRESSURE: 72 MMHG | SYSTOLIC BLOOD PRESSURE: 112 MMHG | WEIGHT: 176 LBS

## 2025-04-24 DIAGNOSIS — Z11.51 SCREENING FOR HPV (HUMAN PAPILLOMAVIRUS): ICD-10-CM

## 2025-04-24 DIAGNOSIS — Z12.31 SCREENING MAMMOGRAM FOR BREAST CANCER: Primary | ICD-10-CM

## 2025-04-24 DIAGNOSIS — Z12.31 BREAST CANCER SCREENING BY MAMMOGRAM: ICD-10-CM

## 2025-04-24 DIAGNOSIS — Z12.4 ENCOUNTER FOR PAPANICOLAOU SMEAR FOR CERVICAL CANCER SCREENING: Primary | ICD-10-CM

## 2025-04-24 DIAGNOSIS — Z01.419 WELL WOMAN EXAM WITH ROUTINE GYNECOLOGICAL EXAM: ICD-10-CM

## 2025-04-24 NOTE — PROGRESS NOTES
Subjective     Chief Complaint   Patient presents with   • Annual Exam     New pt , annual exam   Due for pap this year.  (Never had abnormal)  Have not had a m/g yet.       History of Present Illness    Diamante Roque is a 39 y.o.  who presents for annual exam.    Diamante is new here  Her daughter sees Grey    She is doing well    She has no issues with her periods    She sees Dr Baumann  She is getting ready to retire d/t some neck issues  Planning to see the NP in her office    She has a 15 yo and 8 yo   is a SAHD  She works with [employer]    Her menses are regular every 28-30 days, lasting 4-7 days, dysmenorrhea none   Obstetric History:  OB History          3    Para   3    Term   2       1    AB        Living   2         SAB        IAB        Ectopic        Molar        Multiple        Live Births   2               Menstrual History:     Patient's last menstrual period was 2025 (approximate).         Current contraception: tubal ligation  History of abnormal Pap smear: no  Received Gardasil immunization: no  Perform regular self breast exam: yes - occl  Family history of uterine or ovarian cancer: no  Family History of colon cancer: no  Family history of breast cancer: no    Mammogram: ordered.  Colonoscopy: not indicated.  DEXA: not indicated.    Exercise: moderately active  Calcium/Vitamin D: adequate intake    The following portions of the patient's history were reviewed and updated as appropriate: allergies, current medications, past family history, past medical history, past social history, past surgical history, and problem list.    Review of Systems   All other systems reviewed and are negative.    Review of Systems   Constitutional: Negative for fatigue.   Respiratory: Negative for shortness of breath.    Gastrointestinal: Negative for abdominal pain.   Genitourinary: Negative for dysuria.   Neurological: Negative for headaches.   Psychiatric/Behavioral:  "Negative for dysphoric mood.         Objective   Physical Exam    /72   Ht 165.1 cm (65\")   Wt 79.8 kg (176 lb)   LMP 03/31/2025 (Approximate)   BMI 29.29 kg/m²   General:   alert, comfortable, and no distress   Heart: regular rate and rhythm   Lungs: clear to auscultation bilaterally   Breast: normal appearance, no masses or tenderness, Inspection negative, No nipple retraction or dimpling, No nipple discharge or bleeding, No axillary or supraclavicular adenopathy, Normal to palpation without dominant masses, positive findings: fibrocystic changes   Neck: no adenopathy and no carotid bruit   Abdomen: Not performed today   CVA: Not performed today   Pelvis: External genitalia: normal general appearance  Vaginal: normal mucosa without prolapse or lesions  Cervix: normal appearance and thin prep PAP obtained  Adnexa: normal bimanual exam  Uterus: normal single, nontender   Extremities: Not performed today   Neurologic: negative   Psychiatric: Normal affect, judgement, and mood     Assessment & Plan   Diagnoses and all orders for this visit:    1. Encounter for Papanicolaou smear for cervical cancer screening (Primary)  -     IGP, Aptima HPV, Rfx 16 / 18,45    2. Well woman exam with routine gynecological exam    3. Screening for HPV (human papillomavirus)  -     IGP, Aptima HPV, Rfx 16 / 18,45    4. Breast cancer screening by mammogram  -     Mammo Screening Digital Tomosynthesis Bilateral With CAD        All questions answered.  Breast self exam technique reviewed and patient encouraged to perform self-exam monthly.  Discussed healthy lifestyle modifications.  Recommended 30 minutes of aerobic exercise five times per week.  Discussed calcium needs to prevent osteoporosis.    Pap done  Mammogram ordered  Disc perimenopause and expectations.. call for any issues               "

## 2025-04-25 ENCOUNTER — HOSPITAL ENCOUNTER (OUTPATIENT)
Facility: HOSPITAL | Age: 40
Discharge: HOME | End: 2025-04-25
Admitting: PHYSICIAN ASSISTANT
Payer: COMMERCIAL

## 2025-04-25 PROCEDURE — 77067 SCR MAMMO BI INCL CAD: CPT

## 2025-04-25 PROCEDURE — 77063 BREAST TOMOSYNTHESIS BI: CPT

## 2025-04-28 LAB
CYTOLOGIST CVX/VAG CYTO: NORMAL
CYTOLOGY CVX/VAG DOC CYTO: NORMAL
CYTOLOGY CVX/VAG DOC THIN PREP: NORMAL
DX ICD CODE: NORMAL
HPV GENOTYPE REFLEX: NORMAL
HPV I/H RISK 4 DNA CVX QL PROBE+SIG AMP: NEGATIVE
OTHER STN SPEC: NORMAL
SERVICE CMNT-IMP: NORMAL
STAT OF ADQ CVX/VAG CYTO-IMP: NORMAL

## 2025-04-29 DIAGNOSIS — N64.89 BREAST ASYMMETRY: Primary | ICD-10-CM

## 2025-05-09 ENCOUNTER — APPOINTMENT (OUTPATIENT)
Dept: WOMENS IMAGING | Facility: HOSPITAL | Age: 40
End: 2025-05-09
Payer: COMMERCIAL

## 2025-05-09 PROCEDURE — 77065 DX MAMMO INCL CAD UNI: CPT | Performed by: RADIOLOGY

## 2025-05-09 PROCEDURE — 76642 ULTRASOUND BREAST LIMITED: CPT | Performed by: RADIOLOGY

## 2025-05-09 PROCEDURE — 77061 BREAST TOMOSYNTHESIS UNI: CPT | Performed by: RADIOLOGY

## 2025-05-09 PROCEDURE — G0279 TOMOSYNTHESIS, MAMMO: HCPCS | Performed by: RADIOLOGY

## 2025-05-12 DIAGNOSIS — N64.89 BREAST ASYMMETRY: Primary | ICD-10-CM

## 2025-05-14 NOTE — PROGRESS NOTES
Chief Complaint  Abdominal Pain    Celine Roque is a 39-year-old female new to me and new to our practice.  Past medical history significant for hypertension, GERD, anxiety, vitamin D deficiency.  History of Present Illness  The patient is a 39-year-old female who presents for evaluation of gallstones and acid reflux.    Gallstones  She reports an acute episode of severe pain in late February 2025, which was so intense that it caused difficulty breathing and vomiting. She was taken to [facility] but did not make it there as she passed the stone beforehand. The following day, she contacted her primary care physician (PCP) and requested a referral for an ultrasound, which revealed the presence of multiple gallstones. She has expressed a desire to have her gallbladder removed to prevent future episodes. Since the initial episode, she has experienced several smaller episodes, which she has managed with Advil as per her PCP's advice. Following the acute incident, she experienced significant nausea for approximately 2.5 weeks. She has a history of peptic ulcers and initially thought she was experiencing another ulcer.  - Onset: Acute episode in late February 2025.  - Location: Gallbladder.  - Duration: Acute episode followed by several smaller episodes; significant nausea for approximately 2.5 weeks.  - Character: Severe pain causing difficulty breathing and vomiting; multiple gallstones.  - Alleviating/Aggravating Factors: Managed smaller episodes with Advil; prefers gallbladder removal to prevent future episodes.  - Severity: Severe pain during acute episode; significant nausea for 2.5 weeks.    She reports no recurrent nausea, vomiting, or unintentional weight loss. She also reports no family history of upper GI cancer or colon cancer. She does not have a preferred general surgeon and would like the procedure to be performed at an ambulatory care center if possible. She prefers not to have the  "procedure done at [facility] due to cost considerations.    Acid Reflux  She has been self-medicating with over-the-counter omeprazole, which appears to be effective in managing her symptoms.  - Alleviating Factors: Over-the-counter omeprazole.  - Severity: Symptoms managed effectively with omeprazole.    SOCIAL HISTORY  Marital Status:     FAMILY HISTORY  - Negative for upper GI cancer and colon cancer     Results Reviewed:  US Abdomen Limited (03/12/2025 11:17) (evaluated for epigastric pain)  - There are multiple shadowing gallstones demonstrated. No pericholecystic fluid or biliary duct dilatation with a CBD diameter of 3 mm. The gallbladder appears largely collapsed with some wall thickening.  - The pancreas was obscured due to gas within the overlying bowel  (Refer to report for complete details)    1/17/2024  - CMP (normal LFTs, ALK)  - Lipid panel (, HDL 57, triglyceride 104)  - Vitamin D normal  - CBC (hemoglobin 13.1)    Objective   Vital Signs:  /72   Pulse 98   Temp 97.9 °F (36.6 °C)   Ht 165.1 cm (65\")   Wt 80.4 kg (177 lb 4.8 oz)   SpO2 97%   BMI 29.50 kg/m²   Estimated body mass index is 29.5 kg/m² as calculated from the following:    Height as of this encounter: 165.1 cm (65\").    Weight as of this encounter: 80.4 kg (177 lb 4.8 oz).           Physical Exam  Vitals and nursing note reviewed.   Constitutional:       General: She is not in acute distress.     Appearance: Normal appearance. She is normal weight. She is not ill-appearing, toxic-appearing or diaphoretic.   Eyes:      General: No scleral icterus.     Conjunctiva/sclera: Conjunctivae normal.   Pulmonary:      Effort: Pulmonary effort is normal.   Abdominal:      General: Abdomen is flat. Bowel sounds are normal. There is no distension.      Palpations: Abdomen is soft. There is no mass.      Tenderness: There is no abdominal tenderness. There is no right CVA tenderness, left CVA tenderness, guarding or rebound. "      Hernia: No hernia is present.   Skin:     Coloration: Skin is not jaundiced or pale.      Findings: No erythema or rash.   Neurological:      Mental Status: She is alert and oriented to person, place, and time. Mental status is at baseline.   Psychiatric:         Mood and Affect: Mood normal.         Thought Content: Thought content normal.         Judgment: Judgment normal.               Assessment and Plan     Diagnoses and all orders for this visit:    1. Calculus of gallbladder without cholecystitis without obstruction (Primary)  -     Ambulatory Referral to General Surgery    2. Epigastric pain  -     Ambulatory Referral to Gastroenterology  -     Ambulatory Referral to General Surgery       Assessment & Plan  1. Cholelithiasis:  - Abdominal ultrasound on 03/12/2025 showed multiple shadowing gallstones, no CBD dilation.  - Referral to a general surgeon for evaluation and potential elective cholecystectomy.  - Adhere to a low-fat diet.  - Avoid greasy foods.  - Consume meals in smaller portions.  - Abstain from alcohol, fried foods, and sugar.    2. Gastroesophageal Reflux Disease (GERD):  - Currently taking over-the-counter omeprazole for acid reflux, which seems to address symptoms.  - Discussed antiacid reflux precautions    Follow-up: 6 months to 1 year.     No follow-ups on file.    I have reviewed patient's current medications list, relevant clinical information necessary for today's encounter. I discussed the clinical impression and treatment plan with the patient, who verbalized understanding and in agreement.  All questions were answered and support was provided.    EMR Dragon/Transcription Disclaimer:  This document has been Dictated utilizing Dragon dictation.     Patient or patient representative verbalized consent for the use of Ambient Listening during the visit with  JULIANN Houston for chart documentation. 5/16/2025  08:36 EDT    Patient Instructions   - Referral to general surgery has  been made, you will receive a call from their office to schedule for this consult appointment, as discussed.   - Recommend:  Low Fat diet.   Advised patient to increase fiber intake (fruits and veggies, pectin, oat bran). Restrict legume intake eliminate foods you're reacting to foods most likely to induce symptoms: egg, pork, onion, fowl, milk, coffee, citrus, corn, beans, nuts. Avoid fatty foods, fried foods, sugar. Reduce animal protein. Drink at least 6-8 glasses of water daily.

## 2025-05-16 ENCOUNTER — OFFICE VISIT (OUTPATIENT)
Dept: GASTROENTEROLOGY | Facility: CLINIC | Age: 40
End: 2025-05-16
Payer: COMMERCIAL

## 2025-05-16 VITALS
BODY MASS INDEX: 29.54 KG/M2 | OXYGEN SATURATION: 97 % | SYSTOLIC BLOOD PRESSURE: 110 MMHG | HEART RATE: 98 BPM | HEIGHT: 65 IN | WEIGHT: 177.3 LBS | DIASTOLIC BLOOD PRESSURE: 72 MMHG | TEMPERATURE: 97.9 F

## 2025-05-16 DIAGNOSIS — R10.13 EPIGASTRIC PAIN: ICD-10-CM

## 2025-05-16 DIAGNOSIS — K80.20 CALCULUS OF GALLBLADDER WITHOUT CHOLECYSTITIS WITHOUT OBSTRUCTION: Primary | ICD-10-CM

## 2025-05-16 NOTE — PATIENT INSTRUCTIONS
- Referral to general surgery has been made, you will receive a call from their office to schedule for this consult appointment, as discussed.   - Recommend:  Low Fat diet.   Advised patient to increase fiber intake (fruits and veggies, pectin, oat bran). Restrict legume intake eliminate foods you're reacting to foods most likely to induce symptoms: egg, pork, onion, fowl, milk, coffee, citrus, corn, beans, nuts. Avoid fatty foods, fried foods, sugar. Reduce animal protein. Drink at least 6-8 glasses of water daily.

## 2025-05-20 ENCOUNTER — OFFICE VISIT (OUTPATIENT)
Dept: SURGERY | Facility: CLINIC | Age: 40
End: 2025-05-20
Payer: COMMERCIAL

## 2025-05-20 VITALS
BODY MASS INDEX: 28.99 KG/M2 | HEART RATE: 103 BPM | DIASTOLIC BLOOD PRESSURE: 80 MMHG | SYSTOLIC BLOOD PRESSURE: 110 MMHG | WEIGHT: 174 LBS | HEIGHT: 65 IN | OXYGEN SATURATION: 99 %

## 2025-05-20 DIAGNOSIS — K80.20 CALCULUS OF GALLBLADDER WITHOUT CHOLECYSTITIS WITHOUT OBSTRUCTION: Primary | ICD-10-CM

## 2025-05-20 PROCEDURE — 99204 OFFICE O/P NEW MOD 45 MIN: CPT | Performed by: PHYSICIAN ASSISTANT

## 2025-05-21 ENCOUNTER — ANESTHESIA EVENT (OUTPATIENT)
Age: 40
End: 2025-05-21
Payer: COMMERCIAL

## 2025-05-21 NOTE — SIGNIFICANT NOTE
PAT call complete. Education provided to the patient on the following:      - You will need to arrive on MONDAY 6/2/25 at 1300 at [facility] located at [address].   -Arrival times are subject to change up until the day of surgery. You'll get a call the day prior to your procedure with an arrival time. If your procedure is on a Monday, we will call you on the Friday  before. If your an 0600 arrival, the doors do not unlock until 0600.  - You'll get registered on the first floor then bring your papers up to the 5th floor and a nurse will come out to get you.   - Nothing to eat after midnight the night before your procedure.  - If diabetic and procedure is after noon: No food 8 hours prior to arrival time, and only then only clear liquids 2 hours before arrival time (we'll provide your arrival time the day before surgery).   - You will need to have someone drive you home after your procedure and remain with you for 24 hours after. The  will need to remain on site during your visit.  - Please remove all jewelry, including body piercing's, and leave any valuables at home. Only bring your drivers license and insurance card on day of procedure.  - Please arrive with a full bladder to provide a pregnancy test.   - Do not wear contact lenses; wear glasses and bring your case.  - Do not use any tobacco products on morning of procedure.  - Wash with antibacterial soap (such as Dial) the night before and morning of procedure.  - Wear comfortable clothes.  - Be prepared to provide your last dose of all home medications.  - Coffee and vending available on the 1st and 5th floors; no cafeteria on site.  - Feel free to contact us at: [phone] with any additional questions/concerns.    Take these meds DOS: amlodipine, omeprazole. Hold these meds DOS: flexiril, hctz, lisinopril, zofran. Hold 1-2 weeks prior to surgery: probiotic, vit D.

## 2025-05-21 NOTE — PROGRESS NOTES
ASSESSMENT/PLAN:    39-year-old lady with symptomatic cholelithiasis who wishes to proceed with a  laparoscopic cholecystectomy.  They understand nature of the procedure and the risks including but not limited to bleeding, infection, conversion to open procedure, postoperative bile leak, and the bowel function changes which can accompany cholecystectomy.  All questions were answered at the time of this visit and they were willing to proceed with all recommendations.    CC:     Symptomatic cholelithiasis    HPI:    This is a 39-year-old lady presenting to the office today at the request of Juan Carlos Fregoos as needed for consultation.  Since February of this year she has been experiencing symptoms of epigastric abdominal pain that radiates to her back as well as nausea, vomiting, reflux, belching and bloating.  Her most significant episode occurred in February however she has had multiple smaller episodes ever since then.  She was sent for an ultrasound in March which did demonstrate cholelithiasis with some gallbladder wall thickening noted.  She denies having abdominal pain elsewhere, changes to her bowel function or any additional complaints.    ENDOSCOPY:   None    RADIOLOGY:   Abdominal ultrasound 3/12/2025: Cholelithiasis with some wall thickening    SOCIAL HISTORY:   Every day tobacco use  Occasional alcohol use    FAMILY HISTORY:    Colorectal cancer: None  Gallbladder Disease: Mother and father    PREVIOUS ABDOMINAL SURGERY        OTHER SURGERY  Past Surgical History:   Procedure Laterality Date     SECTION  2016     SECTION  2017     SECTION WITH TUBAL  2017    WISDOM TOOTH EXTRACTION         PAST MEDICAL HISTORY:    Past Medical History:   Diagnosis Date    Allergic     Cat    Allergic rhinitis     Anxiety     Asthma     Cholelithiasis 3/1/2025    Degenerative arthritis     right knee    GERD (gastroesophageal reflux disease)     Hard to intubate     PAT call  "done 5/21/25 ahead of 6/2025 surgery: pt states she was unaware that she was marked as a difficult airway, but it was related to one of her c-sections, where she was rushed from the MD office to the OR. Pt states it was a traumatic experience.    Headache     History of gastroesophageal reflux (GERD)     Hypertension     Kidney stone     Low back ache     Migraine     PMS (premenstrual syndrome)        MEDICATIONS:     Current Outpatient Medications:     amLODIPine (NORVASC) 5 MG tablet, TAKE 1 TABLET BY MOUTH DAILY, Disp: 90 tablet, Rfl: 0    cyclobenzaprine (FLEXERIL) 10 MG tablet, Take 1 tablet by mouth At Night As Needed for Muscle Spasms for up to 90 doses., Disp: 90 tablet, Rfl: 0    hydroCHLOROthiazide 25 MG tablet, TAKE 1 TABLET BY MOUTH DAILY, Disp: 90 tablet, Rfl: 0    lisinopril (PRINIVIL,ZESTRIL) 40 MG tablet, TAKE 1 TABLET BY MOUTH DAILY, Disp: 90 tablet, Rfl: 0    OMEPRAZOLE PO, Take  by mouth., Disp: , Rfl:     ondansetron ODT (ZOFRAN-ODT) 4 MG disintegrating tablet, Place 1 tablet on the tongue Every 8 (Eight) Hours As Needed for Nausea or Vomiting., Disp: 30 tablet, Rfl: 3    Probiotic Product (PROBIOTIC PO), Take  by mouth., Disp: , Rfl:     VITAMIN D PO, Take  by mouth., Disp: , Rfl:     ALLERGIES:   Allergies   Allergen Reactions    Cat Dander        PHYSICAL EXAM:   Constitutional: Well-developed well-nourished, no acute distress  Vital signs:   Height 65\"  Weight 174  BMI 29.1  Neck: Supple, no palpable mass, trachea midline  Respiratory: Clear to auscultation, normal inspiratory effort  Cardiovascular: Regular rate, no murmur, no carotid bruit  Gastrointestinal: Soft, nontender  Psychiatric: Alert and oriented ×3, normal affect            Christiano Charlton PA-C    [facility] - General Surgery  [address], [address]  [place] [address]    [address], [address]  [place] [address]    Office: [phone]  Fax: [phone]     "

## 2025-05-21 NOTE — PAT
[facility] review. Dr. Alarcon reviewed chart on 5/21/25. Okay to proceed at [facility] for scheduled surgery on 6/2/25.

## 2025-05-21 NOTE — H&P (VIEW-ONLY)
ASSESSMENT/PLAN:    39-year-old lady with symptomatic cholelithiasis who wishes to proceed with a  laparoscopic cholecystectomy.  They understand nature of the procedure and the risks including but not limited to bleeding, infection, conversion to open procedure, postoperative bile leak, and the bowel function changes which can accompany cholecystectomy.  All questions were answered at the time of this visit and they were willing to proceed with all recommendations.    CC:     Symptomatic cholelithiasis    HPI:    This is a 39-year-old lady presenting to the office today at the request of Juan Carlos Fregoso as needed for consultation.  Since February of this year she has been experiencing symptoms of epigastric abdominal pain that radiates to her back as well as nausea, vomiting, reflux, belching and bloating.  Her most significant episode occurred in February however she has had multiple smaller episodes ever since then.  She was sent for an ultrasound in March which did demonstrate cholelithiasis with some gallbladder wall thickening noted.  She denies having abdominal pain elsewhere, changes to her bowel function or any additional complaints.    ENDOSCOPY:   None    RADIOLOGY:   Abdominal ultrasound 3/12/2025: Cholelithiasis with some wall thickening    SOCIAL HISTORY:   Every day tobacco use  Occasional alcohol use    FAMILY HISTORY:    Colorectal cancer: None  Gallbladder Disease: Mother and father    PREVIOUS ABDOMINAL SURGERY        OTHER SURGERY  Past Surgical History:   Procedure Laterality Date     SECTION  2016     SECTION  2017     SECTION WITH TUBAL  2017    WISDOM TOOTH EXTRACTION         PAST MEDICAL HISTORY:    Past Medical History:   Diagnosis Date    Allergic     Cat    Allergic rhinitis     Anxiety     Asthma     Cholelithiasis 3/1/2025    Degenerative arthritis     right knee    GERD (gastroesophageal reflux disease)     Hard to intubate     PAT call  "done 5/21/25 ahead of 6/2025 surgery: pt states she was unaware that she was marked as a difficult airway, but it was related to one of her c-sections, where she was rushed from the MD office to the OR. Pt states it was a traumatic experience.    Headache     History of gastroesophageal reflux (GERD)     Hypertension     Kidney stone     Low back ache     Migraine     PMS (premenstrual syndrome)        MEDICATIONS:     Current Outpatient Medications:     amLODIPine (NORVASC) 5 MG tablet, TAKE 1 TABLET BY MOUTH DAILY, Disp: 90 tablet, Rfl: 0    cyclobenzaprine (FLEXERIL) 10 MG tablet, Take 1 tablet by mouth At Night As Needed for Muscle Spasms for up to 90 doses., Disp: 90 tablet, Rfl: 0    hydroCHLOROthiazide 25 MG tablet, TAKE 1 TABLET BY MOUTH DAILY, Disp: 90 tablet, Rfl: 0    lisinopril (PRINIVIL,ZESTRIL) 40 MG tablet, TAKE 1 TABLET BY MOUTH DAILY, Disp: 90 tablet, Rfl: 0    OMEPRAZOLE PO, Take  by mouth., Disp: , Rfl:     ondansetron ODT (ZOFRAN-ODT) 4 MG disintegrating tablet, Place 1 tablet on the tongue Every 8 (Eight) Hours As Needed for Nausea or Vomiting., Disp: 30 tablet, Rfl: 3    Probiotic Product (PROBIOTIC PO), Take  by mouth., Disp: , Rfl:     VITAMIN D PO, Take  by mouth., Disp: , Rfl:     ALLERGIES:   Allergies   Allergen Reactions    Cat Dander        PHYSICAL EXAM:   Constitutional: Well-developed well-nourished, no acute distress  Vital signs:   Height 65\"  Weight 174  BMI 29.1  Neck: Supple, no palpable mass, trachea midline  Respiratory: Clear to auscultation, normal inspiratory effort  Cardiovascular: Regular rate, no murmur, no carotid bruit  Gastrointestinal: Soft, nontender  Psychiatric: Alert and oriented ×3, normal affect            Christiano Charlton PA-C    [facility] - General Surgery  [address], [address]  [place] [address]    [address], [address]  [place] [address]    Office: [phone]  Fax: [phone]     "

## 2025-05-21 NOTE — ANESTHESIA PREPROCEDURE EVALUATION
Anesthesia Evaluation     NPO Solid Status: > 8 hours  NPO Liquid Status: > 2 hours           Airway   Mallampati: II  TM distance: >3 FB  Neck ROM: full  Dental - normal exam     Pulmonary    (+) a smoker Current, cigarettes, asthma,  Cardiovascular     Rhythm: regular  Rate: normal    (+) hypertension      Neuro/Psych  (+) headaches, psychiatric history Anxiety  GI/Hepatic/Renal/Endo    (+) obesity, GERD, renal disease-    Musculoskeletal     Abdominal    Substance History      OB/GYN          Other   arthritis,         Phys Exam Other:   Location: Mouth; Size: 6.5; Cuff: Cuffed; Leak@ :20; Secured@:20 cm; Blade: Mac 3; Grade View: 2; Technique: Atraumatic; Dentition: Intact; Insertion attempts: 1 06/02/16 1209 by Sarahy Orlando MD 06/02/16 1251 by Sarahy Orlando MD  ETT Location: Mouth; Size: 7.0; Cuff: Cuffed; Leak@ :5; Secured@:21 cm; Blade: Mac 3; Grade View: 1; Other devices used: Stylet; Technique: Atraumatic; Post Intubation check: Bilateral breath sounds, End tidal CO2; Dentition: Same as pre-op; Insertion attempts: 1; Removal reason: Criteria Met                 Anesthesia Plan    ASA 2     general     (On 5/21/25, was asked for anesthesia review due to history of difficult airway. In 2016 while pregnant intubated for Csection, documentation states MAC3 grade 2 view with 6.5ETT then also MAC3 grade 1 view 7.0ETT in the same procedure.)  intravenous induction     Anesthetic plan, risks, benefits, and alternatives have been provided, discussed and informed consent has been obtained with: patient.    CODE STATUS:

## 2025-05-22 NOTE — PROGRESS NOTES
I have reviewed the notes, assessments, and/or procedures performed by Christiano Charlton PA-C, I concur with her/his documentation of Diamante Roque.

## 2025-05-27 ENCOUNTER — OFFICE VISIT (OUTPATIENT)
Dept: FAMILY MEDICINE CLINIC | Facility: CLINIC | Age: 40
End: 2025-05-27
Payer: COMMERCIAL

## 2025-05-27 VITALS
HEIGHT: 65 IN | BODY MASS INDEX: 30.26 KG/M2 | OXYGEN SATURATION: 99 % | WEIGHT: 181.6 LBS | HEART RATE: 79 BPM | DIASTOLIC BLOOD PRESSURE: 90 MMHG | SYSTOLIC BLOOD PRESSURE: 130 MMHG | TEMPERATURE: 97.4 F

## 2025-05-27 DIAGNOSIS — Z00.00 ANNUAL PHYSICAL EXAM: Primary | ICD-10-CM

## 2025-05-27 DIAGNOSIS — I10 ESSENTIAL HYPERTENSION: ICD-10-CM

## 2025-05-27 PROCEDURE — 99396 PREV VISIT EST AGE 40-64: CPT | Performed by: NURSE PRACTITIONER

## 2025-05-27 RX ORDER — LISINOPRIL 40 MG/1
40 TABLET ORAL DAILY
Qty: 90 TABLET | Refills: 0 | Status: SHIPPED | OUTPATIENT
Start: 2025-05-27 | End: 2025-05-27

## 2025-05-27 RX ORDER — HYDROCHLOROTHIAZIDE 25 MG/1
25 TABLET ORAL DAILY
Qty: 90 TABLET | Refills: 3 | Status: SHIPPED | OUTPATIENT
Start: 2025-05-27

## 2025-05-27 RX ORDER — AMLODIPINE BESYLATE 5 MG/1
5 TABLET ORAL DAILY
Qty: 90 TABLET | Refills: 3 | Status: SHIPPED | OUTPATIENT
Start: 2025-05-27

## 2025-05-27 RX ORDER — HYDROCHLOROTHIAZIDE 25 MG/1
25 TABLET ORAL DAILY
Qty: 90 TABLET | Refills: 0 | Status: SHIPPED | OUTPATIENT
Start: 2025-05-27 | End: 2025-05-27

## 2025-05-27 RX ORDER — AMLODIPINE BESYLATE 5 MG/1
5 TABLET ORAL DAILY
Qty: 90 TABLET | Refills: 0 | Status: SHIPPED | OUTPATIENT
Start: 2025-05-27 | End: 2025-05-27

## 2025-05-27 RX ORDER — LISINOPRIL 40 MG/1
40 TABLET ORAL DAILY
Qty: 90 TABLET | Refills: 3 | Status: SHIPPED | OUTPATIENT
Start: 2025-05-27

## 2025-05-27 NOTE — PROGRESS NOTES
"Chief Complaint  Med Refill (Request all meds to be refilled)    Celine Roque presents to [facility]  History of Present Illness  Patient is here to get med refills and have annual exam. She is having concerns with chronic abdominal pain and is having a lap cholecystectomy on 6/2/25. She in the meantime is fine with labs as they had not ordered any to be done pre-procedure.     Patient is not interested in vaccinations.   Objective   Vital Signs:  /90 (BP Location: Left arm, Patient Position: Sitting, Cuff Size: Adult)   Pulse 79   Temp 97.4 °F (36.3 °C) (Temporal)   Ht 165.1 cm (65\")   Wt 82.4 kg (181 lb 9.6 oz)   SpO2 99%   BMI 30.22 kg/m²   Estimated body mass index is 30.22 kg/m² as calculated from the following:    Height as of this encounter: 165.1 cm (65\").    Weight as of this encounter: 82.4 kg (181 lb 9.6 oz).            Physical Exam  Constitutional:       Appearance: Normal appearance. She is obese.   HENT:      Head: Normocephalic.      Nose: Nose normal.   Eyes:      Extraocular Movements: Extraocular movements intact.      Pupils: Pupils are equal, round, and reactive to light.   Cardiovascular:      Rate and Rhythm: Normal rate and regular rhythm.      Heart sounds: Normal heart sounds.   Pulmonary:      Effort: Pulmonary effort is normal.      Breath sounds: Normal breath sounds.   Musculoskeletal:         General: Normal range of motion.      Cervical back: Normal range of motion.   Skin:     General: Skin is warm and dry.   Neurological:      General: No focal deficit present.      Mental Status: She is alert and oriented to person, place, and time.   Psychiatric:         Attention and Perception: Attention normal.         Mood and Affect: Affect is angry.         Speech: Speech is rapid and pressured.         Behavior: Behavior is agitated.         Thought Content: Thought content normal.        Result Review :                Assessment " and Plan   Diagnoses and all orders for this visit:    1. Annual physical exam (Primary)    2. Essential hypertension  -     CBC w AUTO Differential  -     Comprehensive metabolic panel  -     Lipid panel  -     Discontinue: lisinopril (PRINIVIL,ZESTRIL) 40 MG tablet; Take 1 tablet by mouth Daily.  Dispense: 90 tablet; Refill: 0  -     Discontinue: hydroCHLOROthiazide 25 MG tablet; Take 1 tablet by mouth Daily.  Dispense: 90 tablet; Refill: 0  -     Discontinue: amLODIPine (NORVASC) 5 MG tablet; Take 1 tablet by mouth Daily.  Dispense: 90 tablet; Refill: 0  -     amLODIPine (NORVASC) 5 MG tablet; Take 1 tablet by mouth Daily.  Dispense: 90 tablet; Refill: 3  -     hydroCHLOROthiazide 25 MG tablet; Take 1 tablet by mouth Daily.  Dispense: 90 tablet; Refill: 3  -     lisinopril (PRINIVIL,ZESTRIL) 40 MG tablet; Take 1 tablet by mouth Daily.  Dispense: 90 tablet; Refill: 3             Follow Up   Return in about 1 year (around 5/27/2026) for Annual physical.  Patient was given instructions and counseling regarding her condition or for health maintenance advice. Please see specific information pulled into the AVS if appropriate.

## 2025-05-28 LAB
ALBUMIN SERPL-MCNC: 4.7 G/DL (ref 3.5–5.2)
ALBUMIN/GLOB SERPL: 2 G/DL
ALP SERPL-CCNC: 65 U/L (ref 39–117)
ALT SERPL-CCNC: 14 U/L (ref 1–33)
AST SERPL-CCNC: 15 U/L (ref 1–32)
BASOPHILS # BLD AUTO: 0.07 10*3/MM3 (ref 0–0.2)
BASOPHILS NFR BLD AUTO: 1 % (ref 0–1.5)
BILIRUB SERPL-MCNC: <0.2 MG/DL (ref 0–1.2)
BUN SERPL-MCNC: 22 MG/DL (ref 6–20)
BUN/CREAT SERPL: 22 (ref 7–25)
CALCIUM SERPL-MCNC: 10.1 MG/DL (ref 8.6–10.5)
CHLORIDE SERPL-SCNC: 101 MMOL/L (ref 98–107)
CHOLEST SERPL-MCNC: 216 MG/DL (ref 0–200)
CO2 SERPL-SCNC: 25.6 MMOL/L (ref 22–29)
CREAT SERPL-MCNC: 1 MG/DL (ref 0.57–1)
EGFRCR SERPLBLD CKD-EPI 2021: 73.2 ML/MIN/1.73
EOSINOPHIL # BLD AUTO: 0.36 10*3/MM3 (ref 0–0.4)
EOSINOPHIL NFR BLD AUTO: 5.1 % (ref 0.3–6.2)
ERYTHROCYTE [DISTWIDTH] IN BLOOD BY AUTOMATED COUNT: 12.3 % (ref 12.3–15.4)
GLOBULIN SER CALC-MCNC: 2.4 GM/DL
GLUCOSE SERPL-MCNC: 89 MG/DL (ref 65–99)
HCT VFR BLD AUTO: 38.7 % (ref 34–46.6)
HDLC SERPL-MCNC: 57 MG/DL (ref 40–60)
HGB BLD-MCNC: 12.9 G/DL (ref 12–15.9)
IMM GRANULOCYTES # BLD AUTO: 0.03 10*3/MM3 (ref 0–0.05)
IMM GRANULOCYTES NFR BLD AUTO: 0.4 % (ref 0–0.5)
LDLC SERPL CALC-MCNC: 135 MG/DL (ref 0–100)
LYMPHOCYTES # BLD AUTO: 2.53 10*3/MM3 (ref 0.7–3.1)
LYMPHOCYTES NFR BLD AUTO: 36 % (ref 19.6–45.3)
MCH RBC QN AUTO: 31 PG (ref 26.6–33)
MCHC RBC AUTO-ENTMCNC: 33.3 G/DL (ref 31.5–35.7)
MCV RBC AUTO: 93 FL (ref 79–97)
MONOCYTES # BLD AUTO: 0.42 10*3/MM3 (ref 0.1–0.9)
MONOCYTES NFR BLD AUTO: 6 % (ref 5–12)
NEUTROPHILS # BLD AUTO: 3.62 10*3/MM3 (ref 1.7–7)
NEUTROPHILS NFR BLD AUTO: 51.5 % (ref 42.7–76)
NRBC BLD AUTO-RTO: 0 /100 WBC (ref 0–0.2)
PLATELET # BLD AUTO: 372 10*3/MM3 (ref 140–450)
POTASSIUM SERPL-SCNC: 4.3 MMOL/L (ref 3.5–5.2)
PROT SERPL-MCNC: 7.1 G/DL (ref 6–8.5)
RBC # BLD AUTO: 4.16 10*6/MM3 (ref 3.77–5.28)
SODIUM SERPL-SCNC: 137 MMOL/L (ref 136–145)
TRIGL SERPL-MCNC: 138 MG/DL (ref 0–150)
VLDLC SERPL CALC-MCNC: 24 MG/DL (ref 5–40)
WBC # BLD AUTO: 7.03 10*3/MM3 (ref 3.4–10.8)

## 2025-05-30 NOTE — DISCHARGE INSTRUCTIONS
POST OP RECOMMENDATIONS  Dr. Soham Purvis  [facility]  [phone]  Discharge Gall Bladder Surgery    Go home, rest and take it easy today; however, you should get up and move about several times today to reduce the risk of developing a blood clot in your legs.   You may experience some dizziness or memory loss from the anesthesia. This may last for the next 24 hours. Someone should plan on staying with you for the first 24 hours for your safety.   Do not make any important legal decisions or sign any legal papers for the next 24 hours.   Eat and drink lightly today. Start off with bland foods at first. You may advance your diet tomorrow as tolerated as long as you do not experience any nausea or vomiting.   Your incisions are covered with skin glue.  This will peel off on its own in 1-2 weeks. If it falls off sooner then that is ok.   You may notice some bleeding/drainage on your outer dressings. A little bloody drainage is normal. If the bleeding/drainage is such that the bandage cannot absorb it, remove the dressing, apply clean gauze and apply firm pressure for a full 15 minutes. If the bleeding continues, please call me.   You may shower tomorrow. No tub baths until your incisions are completely healed.   You have received a prescription for a narcotic pain medicine, as you will have some pain following surgery.  You will not be totally pain free, but your pain medicine should make the pain tolerable. Please take your pain medicine as prescribed and always take your pills with food to prevent nausea. If you are having severe pain that cannot be controlled by the pain medicine, please contact me.  It is not unusual to experience pain/discomfort in your shoulders or your ribs after surgery. It is from the gas used during the laparoscopic procedure and usually lasts 1-3 days. The prescription pain medicine is used to treat the surgical pain and does not typically alleviate this “gassy” pain.    No driving for 24 hours and for as long as you are taking your prescription pain medicine.   You will need to call the office at [phone] to schedule a follow-up appointment in 10-14 days.   Remember to contact me for any of the following:   Fever > 101 degrees  Severe pain that cannot be controlled by taking your pain pills  Severe nausea or vomiting that cannot be controlled by taking your nausea pills  Significant bleeding of your incisions  Drainage that has a bad smell or is yellow or green in appearance  Any other questions or concerns

## 2025-06-02 ENCOUNTER — APPOINTMENT (OUTPATIENT)
Age: 40
End: 2025-06-02
Payer: COMMERCIAL

## 2025-06-02 ENCOUNTER — HOSPITAL ENCOUNTER (OUTPATIENT)
Age: 40
Setting detail: HOSPITAL OUTPATIENT SURGERY
Discharge: HOME | End: 2025-06-02
Attending: SURGERY | Admitting: SURGERY
Payer: COMMERCIAL

## 2025-06-02 ENCOUNTER — ANESTHESIA (OUTPATIENT)
Age: 40
End: 2025-06-02
Payer: COMMERCIAL

## 2025-06-02 VITALS
WEIGHT: 180 LBS | SYSTOLIC BLOOD PRESSURE: 117 MMHG | TEMPERATURE: 97.8 F | DIASTOLIC BLOOD PRESSURE: 79 MMHG | RESPIRATION RATE: 20 BRPM | OXYGEN SATURATION: 100 % | BODY MASS INDEX: 29.99 KG/M2 | HEART RATE: 71 BPM | HEIGHT: 65 IN

## 2025-06-02 DIAGNOSIS — K80.20 CALCULUS OF GALLBLADDER WITHOUT CHOLECYSTITIS WITHOUT OBSTRUCTION: ICD-10-CM

## 2025-06-02 LAB
B-HCG UR QL: NEGATIVE
EXPIRATION DATE: NORMAL
INTERNAL NEGATIVE CONTROL: NEGATIVE
INTERNAL POSITIVE CONTROL: POSITIVE
Lab: NORMAL

## 2025-06-02 PROCEDURE — 25510000002 IOTHALAMATE 60 % SOLUTION: Performed by: SURGERY

## 2025-06-02 PROCEDURE — 25010000002 HYDROMORPHONE PER 4 MG: Performed by: ANESTHESIOLOGY

## 2025-06-02 PROCEDURE — 88304 TISSUE EXAM BY PATHOLOGIST: CPT | Performed by: SURGERY

## 2025-06-02 PROCEDURE — 25010000002 ONDANSETRON PER 1 MG: Performed by: ANESTHESIOLOGY

## 2025-06-02 PROCEDURE — 25010000002 MIDAZOLAM PER 1 MG: Performed by: ANESTHESIOLOGY

## 2025-06-02 PROCEDURE — 25810000003 SODIUM CHLORIDE PER 500 ML: Performed by: SURGERY

## 2025-06-02 PROCEDURE — 25010000002 FENTANYL CITRATE (PF) 50 MCG/ML SOLUTION: Performed by: ANESTHESIOLOGY

## 2025-06-02 PROCEDURE — 25010000002 FAMOTIDINE 10 MG/ML SOLUTION: Performed by: ANESTHESIOLOGY

## 2025-06-02 PROCEDURE — 25010000002 CEFAZOLIN PER 500 MG: Performed by: PHYSICIAN ASSISTANT

## 2025-06-02 PROCEDURE — 74300 X-RAY BILE DUCTS/PANCREAS: CPT

## 2025-06-02 PROCEDURE — 25010000002 PROPOFOL 10 MG/ML EMULSION: Performed by: ANESTHESIOLOGY

## 2025-06-02 PROCEDURE — 25010000002 SUGAMMADEX 200 MG/2ML SOLUTION: Performed by: ANESTHESIOLOGY

## 2025-06-02 PROCEDURE — 25810000003 LACTATED RINGERS PER 1000 ML: Performed by: ANESTHESIOLOGY

## 2025-06-02 PROCEDURE — 25010000002 LIDOCAINE 2% SOLUTION: Performed by: ANESTHESIOLOGY

## 2025-06-02 PROCEDURE — 47563 LAPARO CHOLECYSTECTOMY/GRAPH: CPT | Performed by: SPECIALIST/TECHNOLOGIST, OTHER

## 2025-06-02 PROCEDURE — 47563 LAPARO CHOLECYSTECTOMY/GRAPH: CPT | Performed by: SURGERY

## 2025-06-02 PROCEDURE — 81025 URINE PREGNANCY TEST: CPT | Performed by: ANESTHESIOLOGY

## 2025-06-02 PROCEDURE — 25010000002 DEXAMETHASONE SODIUM PHOSPHATE 20 MG/5ML SOLUTION: Performed by: ANESTHESIOLOGY

## 2025-06-02 DEVICE — CLIP LIGAT VASC HORIZON TI MD/LG GRN 6CT: Type: IMPLANTABLE DEVICE | Site: ABDOMEN | Status: FUNCTIONAL

## 2025-06-02 RX ORDER — FLUMAZENIL 0.1 MG/ML
0.2 INJECTION INTRAVENOUS AS NEEDED
Status: DISCONTINUED | OUTPATIENT
Start: 2025-06-02 | End: 2025-06-02 | Stop reason: HOSPADM

## 2025-06-02 RX ORDER — PROMETHAZINE HYDROCHLORIDE 25 MG/1
25 SUPPOSITORY RECTAL ONCE AS NEEDED
Status: DISCONTINUED | OUTPATIENT
Start: 2025-06-02 | End: 2025-06-02 | Stop reason: HOSPADM

## 2025-06-02 RX ORDER — HYDROCODONE BITARTRATE AND ACETAMINOPHEN 5; 325 MG/1; MG/1
1 TABLET ORAL ONCE AS NEEDED
Status: DISCONTINUED | OUTPATIENT
Start: 2025-06-02 | End: 2025-06-02 | Stop reason: HOSPADM

## 2025-06-02 RX ORDER — SODIUM CHLORIDE, SODIUM LACTATE, POTASSIUM CHLORIDE, CALCIUM CHLORIDE 600; 310; 30; 20 MG/100ML; MG/100ML; MG/100ML; MG/100ML
INJECTION, SOLUTION INTRAVENOUS CONTINUOUS PRN
Status: DISCONTINUED | OUTPATIENT
Start: 2025-06-02 | End: 2025-06-02 | Stop reason: SURG

## 2025-06-02 RX ORDER — LIDOCAINE HYDROCHLORIDE 20 MG/ML
INJECTION, SOLUTION INFILTRATION; PERINEURAL AS NEEDED
Status: DISCONTINUED | OUTPATIENT
Start: 2025-06-02 | End: 2025-06-02 | Stop reason: SURG

## 2025-06-02 RX ORDER — SODIUM CHLORIDE 0.9 % (FLUSH) 0.9 %
3 SYRINGE (ML) INJECTION EVERY 12 HOURS SCHEDULED
Status: DISCONTINUED | OUTPATIENT
Start: 2025-06-02 | End: 2025-06-02 | Stop reason: HOSPADM

## 2025-06-02 RX ORDER — FAMOTIDINE 10 MG/ML
20 INJECTION, SOLUTION INTRAVENOUS ONCE
Status: COMPLETED | OUTPATIENT
Start: 2025-06-02 | End: 2025-06-02

## 2025-06-02 RX ORDER — SODIUM CHLORIDE 0.9 % (FLUSH) 0.9 %
3-10 SYRINGE (ML) INJECTION AS NEEDED
Status: DISCONTINUED | OUTPATIENT
Start: 2025-06-02 | End: 2025-06-02 | Stop reason: HOSPADM

## 2025-06-02 RX ORDER — HYDROMORPHONE HYDROCHLORIDE 1 MG/ML
0.5 INJECTION, SOLUTION INTRAMUSCULAR; INTRAVENOUS; SUBCUTANEOUS
Status: DISCONTINUED | OUTPATIENT
Start: 2025-06-02 | End: 2025-06-02 | Stop reason: HOSPADM

## 2025-06-02 RX ORDER — MAGNESIUM HYDROXIDE 1200 MG/15ML
LIQUID ORAL AS NEEDED
Status: DISCONTINUED | OUTPATIENT
Start: 2025-06-02 | End: 2025-06-02 | Stop reason: HOSPADM

## 2025-06-02 RX ORDER — PROPOFOL 10 MG/ML
VIAL (ML) INTRAVENOUS AS NEEDED
Status: DISCONTINUED | OUTPATIENT
Start: 2025-06-02 | End: 2025-06-02 | Stop reason: SURG

## 2025-06-02 RX ORDER — HYDROCODONE BITARTRATE AND ACETAMINOPHEN 7.5; 325 MG/1; MG/1
1 TABLET ORAL EVERY 4 HOURS PRN
Status: DISCONTINUED | OUTPATIENT
Start: 2025-06-02 | End: 2025-06-02 | Stop reason: HOSPADM

## 2025-06-02 RX ORDER — DEXAMETHASONE SODIUM PHOSPHATE 4 MG/ML
INJECTION, SOLUTION INTRA-ARTICULAR; INTRALESIONAL; INTRAMUSCULAR; INTRAVENOUS; SOFT TISSUE AS NEEDED
Status: DISCONTINUED | OUTPATIENT
Start: 2025-06-02 | End: 2025-06-02 | Stop reason: SURG

## 2025-06-02 RX ORDER — ROCURONIUM BROMIDE 10 MG/ML
INJECTION, SOLUTION INTRAVENOUS AS NEEDED
Status: DISCONTINUED | OUTPATIENT
Start: 2025-06-02 | End: 2025-06-02 | Stop reason: SURG

## 2025-06-02 RX ORDER — HYDRALAZINE HYDROCHLORIDE 20 MG/ML
5 INJECTION INTRAMUSCULAR; INTRAVENOUS
Status: DISCONTINUED | OUTPATIENT
Start: 2025-06-02 | End: 2025-06-02 | Stop reason: HOSPADM

## 2025-06-02 RX ORDER — PROMETHAZINE HYDROCHLORIDE 12.5 MG/1
25 TABLET ORAL ONCE AS NEEDED
Status: DISCONTINUED | OUTPATIENT
Start: 2025-06-02 | End: 2025-06-02 | Stop reason: HOSPADM

## 2025-06-02 RX ORDER — DEXMEDETOMIDINE HYDROCHLORIDE 100 UG/ML
INJECTION, SOLUTION INTRAVENOUS AS NEEDED
Status: DISCONTINUED | OUTPATIENT
Start: 2025-06-02 | End: 2025-06-02 | Stop reason: SURG

## 2025-06-02 RX ORDER — SODIUM CHLORIDE 9 MG/ML
INJECTION, SOLUTION INTRAVENOUS AS NEEDED
Status: DISCONTINUED | OUTPATIENT
Start: 2025-06-02 | End: 2025-06-02 | Stop reason: HOSPADM

## 2025-06-02 RX ORDER — DIPHENHYDRAMINE HYDROCHLORIDE 50 MG/ML
12.5 INJECTION, SOLUTION INTRAMUSCULAR; INTRAVENOUS
Status: DISCONTINUED | OUTPATIENT
Start: 2025-06-02 | End: 2025-06-02 | Stop reason: HOSPADM

## 2025-06-02 RX ORDER — LABETALOL HYDROCHLORIDE 5 MG/ML
5 INJECTION, SOLUTION INTRAVENOUS
Status: DISCONTINUED | OUTPATIENT
Start: 2025-06-02 | End: 2025-06-02 | Stop reason: HOSPADM

## 2025-06-02 RX ORDER — FENTANYL CITRATE 50 UG/ML
INJECTION, SOLUTION INTRAMUSCULAR; INTRAVENOUS AS NEEDED
Status: DISCONTINUED | OUTPATIENT
Start: 2025-06-02 | End: 2025-06-02 | Stop reason: SURG

## 2025-06-02 RX ORDER — MIDAZOLAM HYDROCHLORIDE 1 MG/ML
1 INJECTION, SOLUTION INTRAMUSCULAR; INTRAVENOUS
Status: COMPLETED | OUTPATIENT
Start: 2025-06-02 | End: 2025-06-02

## 2025-06-02 RX ORDER — ONDANSETRON 2 MG/ML
4 INJECTION INTRAMUSCULAR; INTRAVENOUS ONCE AS NEEDED
Status: DISCONTINUED | OUTPATIENT
Start: 2025-06-02 | End: 2025-06-02 | Stop reason: HOSPADM

## 2025-06-02 RX ORDER — FENTANYL CITRATE 50 UG/ML
50 INJECTION, SOLUTION INTRAMUSCULAR; INTRAVENOUS
Status: DISCONTINUED | OUTPATIENT
Start: 2025-06-02 | End: 2025-06-02 | Stop reason: HOSPADM

## 2025-06-02 RX ORDER — FENTANYL CITRATE 50 UG/ML
25 INJECTION, SOLUTION INTRAMUSCULAR; INTRAVENOUS ONCE AS NEEDED
Status: DISCONTINUED | OUTPATIENT
Start: 2025-06-02 | End: 2025-06-02 | Stop reason: HOSPADM

## 2025-06-02 RX ORDER — ONDANSETRON 2 MG/ML
INJECTION INTRAMUSCULAR; INTRAVENOUS AS NEEDED
Status: DISCONTINUED | OUTPATIENT
Start: 2025-06-02 | End: 2025-06-02 | Stop reason: SURG

## 2025-06-02 RX ORDER — BUPIVACAINE HYDROCHLORIDE AND EPINEPHRINE 5; 5 MG/ML; UG/ML
INJECTION, SOLUTION EPIDURAL; INTRACAUDAL; PERINEURAL AS NEEDED
Status: DISCONTINUED | OUTPATIENT
Start: 2025-06-02 | End: 2025-06-02 | Stop reason: HOSPADM

## 2025-06-02 RX ORDER — DROPERIDOL 2.5 MG/ML
0.62 INJECTION, SOLUTION INTRAMUSCULAR; INTRAVENOUS
Status: DISCONTINUED | OUTPATIENT
Start: 2025-06-02 | End: 2025-06-02 | Stop reason: HOSPADM

## 2025-06-02 RX ORDER — TRAMADOL HYDROCHLORIDE 50 MG/1
50 TABLET ORAL EVERY 6 HOURS PRN
Qty: 12 TABLET | Refills: 0 | Status: SHIPPED | OUTPATIENT
Start: 2025-06-02

## 2025-06-02 RX ORDER — NALOXONE HCL 0.4 MG/ML
0.2 VIAL (ML) INJECTION AS NEEDED
Status: DISCONTINUED | OUTPATIENT
Start: 2025-06-02 | End: 2025-06-02 | Stop reason: HOSPADM

## 2025-06-02 RX ORDER — SODIUM CHLORIDE, SODIUM LACTATE, POTASSIUM CHLORIDE, CALCIUM CHLORIDE 600; 310; 30; 20 MG/100ML; MG/100ML; MG/100ML; MG/100ML
9 INJECTION, SOLUTION INTRAVENOUS CONTINUOUS
Status: DISCONTINUED | OUTPATIENT
Start: 2025-06-02 | End: 2025-06-02 | Stop reason: HOSPADM

## 2025-06-02 RX ADMIN — DEXMEDETOMIDINE HCL 8 MCG: 100 INJECTION INTRAVENOUS at 12:23

## 2025-06-02 RX ADMIN — HYDROMORPHONE HYDROCHLORIDE 0.5 MG: 1 INJECTION, SOLUTION INTRAMUSCULAR; INTRAVENOUS; SUBCUTANEOUS at 14:12

## 2025-06-02 RX ADMIN — SODIUM CHLORIDE, POTASSIUM CHLORIDE, SODIUM LACTATE AND CALCIUM CHLORIDE: 600; 310; 30; 20 INJECTION, SOLUTION INTRAVENOUS at 12:02

## 2025-06-02 RX ADMIN — PROPOFOL 200 MG: 10 INJECTION, EMULSION INTRAVENOUS at 12:04

## 2025-06-02 RX ADMIN — DEXAMETHASONE SODIUM PHOSPHATE 8 MG: 4 INJECTION, SOLUTION INTRAMUSCULAR; INTRAVENOUS at 12:12

## 2025-06-02 RX ADMIN — CEFAZOLIN 2000 MG: 2 INJECTION, POWDER, FOR SOLUTION INTRAMUSCULAR; INTRAVENOUS at 11:54

## 2025-06-02 RX ADMIN — ROCURONIUM 60 MG: 50 INJECTION, SOLUTION INTRAVENOUS at 12:04

## 2025-06-02 RX ADMIN — SODIUM CHLORIDE, POTASSIUM CHLORIDE, SODIUM LACTATE AND CALCIUM CHLORIDE 9 ML/HR: 600; 310; 30; 20 INJECTION, SOLUTION INTRAVENOUS at 11:23

## 2025-06-02 RX ADMIN — MIDAZOLAM 1 MG: 1 INJECTION INTRAMUSCULAR; INTRAVENOUS at 11:33

## 2025-06-02 RX ADMIN — LIDOCAINE HYDROCHLORIDE 80 MG: 20 INJECTION, SOLUTION INFILTRATION; PERINEURAL at 12:04

## 2025-06-02 RX ADMIN — MIDAZOLAM 1 MG: 1 INJECTION INTRAMUSCULAR; INTRAVENOUS at 11:38

## 2025-06-02 RX ADMIN — HYDROMORPHONE HYDROCHLORIDE 0.5 MG: 1 INJECTION, SOLUTION INTRAMUSCULAR; INTRAVENOUS; SUBCUTANEOUS at 14:07

## 2025-06-02 RX ADMIN — HYDROCODONE BITARTRATE AND ACETAMINOPHEN 1 TABLET: 7.5; 325 TABLET ORAL at 13:52

## 2025-06-02 RX ADMIN — FENTANYL CITRATE 50 MCG: 50 INJECTION, SOLUTION INTRAMUSCULAR; INTRAVENOUS at 12:04

## 2025-06-02 RX ADMIN — FENTANYL CITRATE 50 MCG: 50 INJECTION INTRAMUSCULAR; INTRAVENOUS at 13:49

## 2025-06-02 RX ADMIN — METOPROLOL TARTRATE 3 MG: 5 INJECTION, SOLUTION INTRAVENOUS at 12:14

## 2025-06-02 RX ADMIN — ONDANSETRON 4 MG: 2 INJECTION INTRAMUSCULAR; INTRAVENOUS at 13:13

## 2025-06-02 RX ADMIN — DEXMEDETOMIDINE HCL 12 MCG: 100 INJECTION INTRAVENOUS at 12:08

## 2025-06-02 RX ADMIN — FAMOTIDINE 20 MG: 10 INJECTION, SOLUTION INTRAVENOUS at 11:34

## 2025-06-02 RX ADMIN — SUGAMMADEX 200 MG: 100 INJECTION, SOLUTION INTRAVENOUS at 13:25

## 2025-06-02 NOTE — ANESTHESIA POSTPROCEDURE EVALUATION
Patient: Diamante Roque    Procedure Summary       Date: 06/02/25 Room / Location: [facility] ASC OR 03 / [facility] MAIN OR    Anesthesia Start: 1158 Anesthesia Stop: 1341    Procedure: CHOLECYSTECTOMY LAPAROSCOPIC INTRAOPERATIVE CHOLANGIOGRAM (Abdomen) Diagnosis:       Calculus of gallbladder without cholecystitis without obstruction      (Calculus of gallbladder without cholecystitis without obstruction [K80.20])    Surgeons: Soham Purvis MD Provider: Marnie Cool MD    Anesthesia Type: general ASA Status: 2            Anesthesia Type: general    Vitals  Vitals Value Taken Time   /95 06/02/25 14:16   Temp 36.3 °C (97.4 °F) 06/02/25 14:15   Pulse 72 06/02/25 14:21   Resp 20 06/02/25 14:15   SpO2 100 % 06/02/25 14:21   Vitals shown include unfiled device data.        Post Anesthesia Care and Evaluation    Patient location during evaluation: PHASE II  Patient participation: complete - patient participated  Level of consciousness: awake and alert  Pain management: adequate    Airway patency: patent  Anesthetic complications: No anesthetic complications  PONV Status: none  Cardiovascular status: acceptable and hemodynamically stable  Respiratory status: acceptable, nonlabored ventilation and spontaneous ventilation  Hydration status: acceptable

## 2025-06-02 NOTE — OP NOTE
OPERATIVE REPORT     DATE OF OPERATION: 06/02/25     SURGEON:   Soham Purvis MD    ASSISTANT:  Ashkan Prieto  was responsible for performing the following activities: Retraction, Suturing, Closing, Placing Dressing, and Held/Positioned Camera and their skilled assistance was necessary for the success of this case.      PREOPERATIVE DIAGNOSIS: Symptomatic cholelithiasis    POSTOPERATIVE DIAGNOSIS: Chronic cholecystitis    PROCEDURE PERFORMED: Laparoscopic cholecystectomy with intraoperative cholangiogram    ANESTHESIA: General    SPECIMEN: Gallbladder    DRAINS: None    BLOOD LOSS: 10 cc    INDICATIONS FOR OPERATION: Ms. Diamante Roque is a 39 y.o. year old with signs and symptoms consistent with symptomatic cholelithiasis. Laparoscopic cholecystectomy with cholangiogram was recommended. All risks (including bleeding, infection, damage to surrounding structures, bile duct injury, and bile leak), benefits, and alternatives were explained to the patient and she agreed and wished to proceed.  Informed consent was obtained.    FINDINGS:   Chronically inflamed, contracted gallbladder  Critical view of safety prior to performing cholangiogram  Normal intraoperative cholangiogram with retrohepatic filling of left and right hepatic duct, brisk emptying of contrast into duodenum without filling defect.    OPERATIVE REPORT: The patient was taken to the operating room, transferred onto the operating room table, and underwent general endotracheal anesthesia without incident. The patient was prepped and draped in the usual sterile fashion.  Preoperative antibiotics were given, and a timeout was performed.  Half percent Marcaine with epinephrine was and injected into the skin and subcutaneous tissues prior to all incisions.  After confirmation of an orogastric tube being placed, a veress needle was inserted at tse's point.  A reassuring saline drop test was performed.  The abdomen was then entered through a periumbilical  incision using an optiview technique.  The viscera underlying the veress needle was inspected for evidence of injury and the veress needle withdrawn.  2 additional 5 mm trocars were placed in the right upper quadrant and an 11 mm trocar was placed below the xyphoid under direct visualization.  The gallbladder was identified in the right upper quadrant and was contracted in appearance and appeared thickened consistent with chronic inflammation.  The gallbladder was retracted cranially and laterally to allow for adequate visualization.  The area around the infundibulum was dissected with hook cautery and blunt dissection until the cystic duct and artery were identified. A critical view of safety was obtained. A cholangiogram was done by placing a clip on the cystic duct and incising it just distal to this.  A cholangiogram catheter was introduced with an Angiocath needle and directed into the cystic duct.  A clip was applied.  With fluoroscopy, contrast was injected and confirmed the anatomy and that there were no stones present.  Contrast was seen going into the right and left hepatic ducts as well as the duodenum.  The cholangiogram catheter was then removed.  The cystic duct had 2 clips placed on the bile duct side and was transected between those 2 clips and the previously placed clip on the infundibulum side of the cystic duct.  The cystic artery was also clipped and divided.  Bovie electrocautery was then used to remove the gallbladder from the liver bed.  The gallbladder was placed into a bag. Hemostasis was obtained with the hook cautery. The area was then irrigated and inspected for bleeding and bile leak.  No bleeding or bile was noted.  The gallbladder was then removed through the subxiphoid port.  There is a large irregular stone within the gallbladder and while trying to remove the gallbladder the bag that the gallbladder been placed into broke.  Incision was enlarged slightly with the cautery and the  gallbladder was removed in its entirety from the abdominal wall.  11 mm trocar reinserted and remaining trocars removed under direct visualization.  Posterior fascia reapproximated with 0 PDS.  Wound irrigated with Betadine.  Anterior fascia closed with 0 PDS.  Wound again irrigated with Betadine and all incisions were closed with 4-0 Monocryl in subcuticular fashion and Exofin glue placed over it.  All needle and lap counts were correct at the end of the case.  The patient was awoken from general endotracheal anesthesia and taken to the recovery area for further monitoring.    Soham Purvis M.D.  General and Endoscopic Surgery  [facility]    [address], [address]  [place], [place], [address]  P: [phone]  F: [phone]

## 2025-06-02 NOTE — ANESTHESIA PROCEDURE NOTES
Airway  Reason: elective    Date/Time: 6/2/2025 12:06 PM  Airway not difficult    General Information and Staff    Patient location during procedure: OR  Anesthesiologist: Marnie Cool MD    Indications and Patient Condition  Indications for airway management: airway protection    Preoxygenated: yes  MILS maintained throughout    Mask difficulty assessment: 1 - vent by mask    Final Airway Details    Final airway type: endotracheal airway      Successful airway: ETT  Cuffed: yes   Successful intubation technique: direct laryngoscopy  Endotracheal tube insertion site: oral  Blade: Edith  Blade size: 3  ETT size (mm): 7.0  Cormack-Lehane Classification: grade I - full view of glottis  Placement verified by: chest auscultation and capnometry   Measured from: teeth  Number of attempts at approach: 1  Assessment: lips, teeth, and gum same as pre-op and atraumatic intubation

## 2025-06-02 NOTE — INTERVAL H&P NOTE
H&P updated. The patient was examined and the following changes are noted:  Risk and rationale for laparoscopic cholecystectomy with possible intraoperative cholangiogram were reviewed with her.

## 2025-06-04 LAB
CYTO UR: NORMAL
LAB AP CASE REPORT: NORMAL
PATH REPORT.FINAL DX SPEC: NORMAL
PATH REPORT.GROSS SPEC: NORMAL

## 2025-07-31 DIAGNOSIS — I10 ESSENTIAL HYPERTENSION: ICD-10-CM

## 2025-07-31 RX ORDER — AMLODIPINE BESYLATE 5 MG/1
5 TABLET ORAL DAILY
Qty: 90 TABLET | Refills: 3 | Status: SHIPPED | OUTPATIENT
Start: 2025-07-31

## (undated) DEVICE — SUT PDS 0 CT1 36IN Z346H

## (undated) DEVICE — HVTA: Brand: EXOFIN HVTA, 1G

## (undated) DEVICE — GLV SURG PREMIERPRO ORTHO LTX PF SZ7.5 BRN

## (undated) DEVICE — EXOFIN PRECISION PEN HIGH VISCOSITY TOPICAL SKIN ADHESIVE: Brand: EXOFIN PRECISION PEN, 1G

## (undated) DEVICE — DEV SUT GRSPR CLOSUR 15CM 14G

## (undated) DEVICE — SUT MNCRYL PLS ANTIB UD 4/0 PS2 18IN

## (undated) DEVICE — ADAPT TBG INSULF FOR/NO/HEAT/CO2/GAS REUS

## (undated) DEVICE — TOWEL,OR,DSP,ST,BLUE,STD,4/PK,20PK/CS: Brand: MEDLINE

## (undated) DEVICE — GLV SURG BIOGEL LTX PF 8

## (undated) DEVICE — LAPAROVUE VISIBILITY SYSTEM LAPAROSCOPIC SOLUTIONS: Brand: LAPAROVUE

## (undated) DEVICE — GLV SURG SENSICARE POLYISPRN W/ALOE PF LF 6.5 GRN STRL

## (undated) DEVICE — SYR LL W/SCALE/MARK 3ML STRL

## (undated) DEVICE — PENCL SMOKE/EVAC MEGADYNE TELESCP 15FT

## (undated) DEVICE — SOL NACL 0.9PCT 1000ML

## (undated) DEVICE — DRAPE,REIN 53X77,STERILE: Brand: MEDLINE

## (undated) DEVICE — SUT VIC 0/0 UR6 27IN DYED J603H

## (undated) DEVICE — ENDOPATH PNEUMONEEDLE INSUFFLATION NEEDLES WITH LUER LOCK CONNECTORS 120MM: Brand: ENDOPATH

## (undated) DEVICE — THE STERILE LIGHT HANDLE COVER IS USED WITH STERIS SURGICAL LIGHTING AND VISUALIZATION SYSTEMS.

## (undated) DEVICE — ENDOPATH XCEL BLADELESS TROCARS WITH STABILITY SLEEVES: Brand: ENDOPATH XCEL

## (undated) DEVICE — SYR LL TP 10ML STRL

## (undated) DEVICE — LUER-LOK 360°: Brand: CONNECTA, LUER-LOK

## (undated) DEVICE — SYR LUERLOK 20CC BX/50

## (undated) DEVICE — BLANKT WARM UPPR/BDY ARM/OUT 57X196CM

## (undated) DEVICE — GLOVE,SURG,SENSICARE,ALOE,LF,PF,6: Brand: MEDLINE

## (undated) DEVICE — PK LAP CHOLE BG

## (undated) DEVICE — ENDOPOUCH RETRIEVER SPECIMEN RETRIEVAL BAGS: Brand: ENDOPOUCH RETRIEVER